# Patient Record
Sex: FEMALE | Race: BLACK OR AFRICAN AMERICAN | NOT HISPANIC OR LATINO | Employment: UNEMPLOYED | URBAN - METROPOLITAN AREA
[De-identification: names, ages, dates, MRNs, and addresses within clinical notes are randomized per-mention and may not be internally consistent; named-entity substitution may affect disease eponyms.]

---

## 2018-08-12 ENCOUNTER — HOSPITAL ENCOUNTER (EMERGENCY)
Facility: HOSPITAL | Age: 28
Discharge: PSYCHIATRIC OR SUBSTANCE USE FACILITY | End: 2018-08-12
Attending: EMERGENCY MEDICINE

## 2018-08-12 VITALS
DIASTOLIC BLOOD PRESSURE: 55 MMHG | HEART RATE: 76 BPM | TEMPERATURE: 98 F | RESPIRATION RATE: 12 BRPM | OXYGEN SATURATION: 100 % | SYSTOLIC BLOOD PRESSURE: 112 MMHG

## 2018-08-12 DIAGNOSIS — Z91.199 MEDICALLY NONCOMPLIANT: ICD-10-CM

## 2018-08-12 DIAGNOSIS — F33.2 SEVERE EPISODE OF RECURRENT MAJOR DEPRESSIVE DISORDER, WITHOUT PSYCHOTIC FEATURES: Primary | ICD-10-CM

## 2018-08-12 DIAGNOSIS — R46.2 BIZARRE BEHAVIOR: ICD-10-CM

## 2018-08-12 LAB
ALBUMIN SERPL BCP-MCNC: 4 G/DL
ALP SERPL-CCNC: 70 U/L
ALT SERPL W/O P-5'-P-CCNC: 11 U/L
AMPHET+METHAMPHET UR QL: NEGATIVE
ANION GAP SERPL CALC-SCNC: 11 MMOL/L
APAP SERPL-MCNC: <3 UG/ML
AST SERPL-CCNC: 13 U/L
B-HCG UR QL: NEGATIVE
BACTERIA #/AREA URNS AUTO: NORMAL /HPF
BARBITURATES UR QL SCN>200 NG/ML: NEGATIVE
BASOPHILS # BLD AUTO: 0.02 K/UL
BASOPHILS NFR BLD: 0.2 %
BENZODIAZ UR QL SCN>200 NG/ML: NEGATIVE
BILIRUB SERPL-MCNC: 0.4 MG/DL
BILIRUB UR QL STRIP: ABNORMAL
BUN SERPL-MCNC: 10 MG/DL
BZE UR QL SCN: NEGATIVE
CALCIUM SERPL-MCNC: 9.5 MG/DL
CANNABINOIDS UR QL SCN: NEGATIVE
CHLORIDE SERPL-SCNC: 106 MMOL/L
CLARITY UR REFRACT.AUTO: ABNORMAL
CO2 SERPL-SCNC: 24 MMOL/L
COLOR UR AUTO: YELLOW
CREAT SERPL-MCNC: 0.8 MG/DL
CREAT UR-MCNC: 226.6 MG/DL
DIFFERENTIAL METHOD: ABNORMAL
EOSINOPHIL # BLD AUTO: 0 K/UL
EOSINOPHIL NFR BLD: 0.1 %
ERYTHROCYTE [DISTWIDTH] IN BLOOD BY AUTOMATED COUNT: 14.7 %
EST. GFR  (AFRICAN AMERICAN): >60 ML/MIN/1.73 M^2
EST. GFR  (NON AFRICAN AMERICAN): >60 ML/MIN/1.73 M^2
ETHANOL SERPL-MCNC: <10 MG/DL
GLUCOSE SERPL-MCNC: 86 MG/DL
GLUCOSE UR QL STRIP: NEGATIVE
HCT VFR BLD AUTO: 40.3 %
HGB BLD-MCNC: 13.8 G/DL
HGB UR QL STRIP: NEGATIVE
KETONES UR QL STRIP: ABNORMAL
LEUKOCYTE ESTERASE UR QL STRIP: NEGATIVE
LYMPHOCYTES # BLD AUTO: 2.1 K/UL
LYMPHOCYTES NFR BLD: 19.3 %
MCH RBC QN AUTO: 28.8 PG
MCHC RBC AUTO-ENTMCNC: 34.2 G/DL
MCV RBC AUTO: 84 FL
METHADONE UR QL SCN>300 NG/ML: NEGATIVE
MICROSCOPIC COMMENT: NORMAL
MONOCYTES # BLD AUTO: 0.6 K/UL
MONOCYTES NFR BLD: 5.4 %
NEUTROPHILS # BLD AUTO: 8.3 K/UL
NEUTROPHILS NFR BLD: 74.8 %
NITRITE UR QL STRIP: NEGATIVE
OPIATES UR QL SCN: NEGATIVE
PCP UR QL SCN>25 NG/ML: NEGATIVE
PH UR STRIP: 6 [PH] (ref 5–8)
PLATELET # BLD AUTO: 341 K/UL
PMV BLD AUTO: 10.6 FL
POTASSIUM SERPL-SCNC: 3.8 MMOL/L
PROT SERPL-MCNC: 7.8 G/DL
PROT UR QL STRIP: NEGATIVE
RBC # BLD AUTO: 4.79 M/UL
RBC #/AREA URNS AUTO: 2 /HPF (ref 0–4)
SALICYLATES SERPL-MCNC: <5 MG/DL
SODIUM SERPL-SCNC: 141 MMOL/L
SP GR UR STRIP: 1.02 (ref 1–1.03)
TOXICOLOGY INFORMATION: NORMAL
TSH SERPL DL<=0.005 MIU/L-ACNC: 0.66 UIU/ML
URN SPEC COLLECT METH UR: ABNORMAL
UROBILINOGEN UR STRIP-ACNC: <2 EU/DL
WBC # BLD AUTO: 11.06 K/UL

## 2018-08-12 PROCEDURE — 80329 ANALGESICS NON-OPIOID 1 OR 2: CPT

## 2018-08-12 PROCEDURE — 80307 DRUG TEST PRSMV CHEM ANLYZR: CPT

## 2018-08-12 PROCEDURE — 63600175 PHARM REV CODE 636 W HCPCS: Performed by: EMERGENCY MEDICINE

## 2018-08-12 PROCEDURE — 96372 THER/PROPH/DIAG INJ SC/IM: CPT

## 2018-08-12 PROCEDURE — 81000 URINALYSIS NONAUTO W/SCOPE: CPT | Mod: 59

## 2018-08-12 PROCEDURE — 85025 COMPLETE CBC W/AUTO DIFF WBC: CPT

## 2018-08-12 PROCEDURE — 80053 COMPREHEN METABOLIC PANEL: CPT

## 2018-08-12 PROCEDURE — 84443 ASSAY THYROID STIM HORMONE: CPT

## 2018-08-12 PROCEDURE — 81025 URINE PREGNANCY TEST: CPT

## 2018-08-12 PROCEDURE — 80320 DRUG SCREEN QUANTALCOHOLS: CPT

## 2018-08-12 PROCEDURE — 99285 EMERGENCY DEPT VISIT HI MDM: CPT | Mod: 25

## 2018-08-12 RX ORDER — LORAZEPAM 1 MG/1
1 TABLET ORAL
Status: DISCONTINUED | OUTPATIENT
Start: 2018-08-12 | End: 2018-08-12

## 2018-08-12 RX ORDER — HALOPERIDOL 5 MG/1
10 TABLET ORAL
Status: DISCONTINUED | OUTPATIENT
Start: 2018-08-12 | End: 2018-08-12

## 2018-08-12 RX ORDER — ZIPRASIDONE MESYLATE 20 MG/ML
10 INJECTION, POWDER, LYOPHILIZED, FOR SOLUTION INTRAMUSCULAR
Status: COMPLETED | OUTPATIENT
Start: 2018-08-12 | End: 2018-08-12

## 2018-08-12 RX ORDER — ALPRAZOLAM 0.5 MG/1
0.5 TABLET ORAL 2 TIMES DAILY PRN
COMMUNITY

## 2018-08-12 RX ORDER — RISPERIDONE 2 MG/1
2 TABLET ORAL 2 TIMES DAILY
COMMUNITY
End: 2022-06-26 | Stop reason: SDUPTHER

## 2018-08-12 RX ORDER — HALOPERIDOL 10 MG/1
10 TABLET ORAL NIGHTLY
COMMUNITY
End: 2022-06-26 | Stop reason: SDUPTHER

## 2018-08-12 RX ADMIN — ZIPRASIDONE MESYLATE 10 MG: 20 INJECTION, POWDER, LYOPHILIZED, FOR SOLUTION INTRAMUSCULAR at 10:08

## 2018-08-12 NOTE — ED PROVIDER NOTES
Encounter Date: 2018       History     Chief Complaint   Patient presents with    Psychiatric Evaluation     The history is provided by the patient.   Mental Health Problem   The primary symptoms include delusions and bizarre behavior. The current episode started today. This is a chronic problem.   The degree of incapacity that she is experiencing as a consequence of her illness is moderate. Sequelae of the illness include harmed interpersonal relations. She does not admit to suicidal ideas. She does not contemplate harming herself. Risk factors that are present for mental illness include a history of mental illness.     Review of patient's allergies indicates:  No Known Allergies  Past Medical History:   Diagnosis Date    Anxiety     Chronic mental illness     Depression     Pulmonary embolus      Past Surgical History:   Procedure Laterality Date     SECTION       Family History   Problem Relation Age of Onset    Hyperlipidemia Mother     Hyperlipidemia Father     Kidney disease Father      Social History     Tobacco Use    Smoking status: Current Every Day Smoker     Packs/day: 0.50     Types: Cigarettes   Substance Use Topics    Alcohol use: No    Drug use: No     Review of Systems   Constitutional: Negative for fever.   HENT: Negative for sore throat.    Respiratory: Negative for shortness of breath.    Cardiovascular: Negative for chest pain.   Gastrointestinal: Negative for nausea.   Genitourinary: Negative for dysuria.   Musculoskeletal: Negative for back pain.   Skin: Negative for rash.   Neurological: Negative for weakness.   Hematological: Does not bruise/bleed easily.       Physical Exam     Initial Vitals [18 1715]   BP Pulse Resp Temp SpO2   136/83 110 20 98.7 °F (37.1 °C) 98 %      MAP       --         Vitals:    18 1715 18 1851   BP: 136/83 116/79   Pulse: 110 98   Resp: 20 18   Temp: 98.7 °F (37.1 °C) 99.1 °F (37.3 °C)   TempSrc: Oral Oral   SpO2: 98% 99%          Physical Exam    Nursing note and vitals reviewed.  Constitutional: She appears well-developed and well-nourished. No distress.   HENT:   Head: Normocephalic and atraumatic.   Mouth/Throat: Oropharynx is clear and moist.   Eyes: Conjunctivae and EOM are normal. Pupils are equal, round, and reactive to light.   Neck: Normal range of motion. Neck supple.   Cardiovascular: Normal rate, regular rhythm and normal heart sounds. Exam reveals no gallop and no friction rub.    No murmur heard.  Pulmonary/Chest: Breath sounds normal. No respiratory distress. She has no wheezes. She has no rhonchi. She has no rales.   Abdominal: Soft. Bowel sounds are normal. She exhibits no distension and no mass. There is no tenderness. There is no rebound and no guarding.   Musculoskeletal: Normal range of motion. She exhibits no edema or tenderness.   Neurological: She is alert and oriented to person, place, and time. She has normal strength.   Skin: Skin is warm and dry. No rash noted.   Psychiatric: She has a normal mood and affect. Her speech is delayed. She is slowed and withdrawn. Thought content is paranoid and delusional. She expresses no homicidal and no suicidal ideation.         ED Course   Procedures  Labs Reviewed   CBC W/ AUTO DIFFERENTIAL - Abnormal; Notable for the following components:       Result Value    RDW 14.7 (*)     Gran # (ANC) 8.3 (*)     Gran% 74.8 (*)     All other components within normal limits   URINALYSIS, REFLEX TO URINE CULTURE - Abnormal; Notable for the following components:    Appearance, UA Cloudy (*)     Ketones, UA 1+ (*)     Bilirubin (UA) 1+ (*)     All other components within normal limits    Narrative:     Preferred Collection Type->Urine, Clean Catch   SALICYLATE LEVEL - Abnormal; Notable for the following components:    Salicylate Lvl <5.0 (*)     All other components within normal limits   ACETAMINOPHEN LEVEL - Abnormal; Notable for the following components:    Acetaminophen (Tylenol),  Serum <3.0 (*)     All other components within normal limits   COMPREHENSIVE METABOLIC PANEL   PREGNANCY TEST, URINE RAPID   TSH   DRUG SCREEN PANEL, URINE EMERGENCY   ALCOHOL,MEDICAL (ETHANOL)   URINALYSIS MICROSCOPIC    Narrative:     Preferred Collection Type->Urine, Clean Catch          Imaging Results    None          Medical Decision Making:   ED Management:  The patient was received from the off-going emergency room physician Dr Moy at 6:00PM.  All pertinent details presently available from the patient encounter were discussed along with the expected plan for disposition.      All historical, clinical, and laboratory findings were reviewed with the patient/family in detail along with the indications for transfer (gravely disabled) to an inpatient psychiatric services as we do not have those services available at this facility.  All remaining questions and concerns were addressed at that time and the patient/family communicates understanding and agrees to proceed accordingly.      At this point the patient has been medically stabilized for transfer to inpatient psychiatry.     All pertinent details of the encounter were discussed with Dr Zapata at [facility] who agrees to accept the patient in transfer based on the needs/patient preferences outlined above.  Patient will be transferred by [facility] secondary to a need for secure/protective transport given the nature of the patients illness.                          Clinical Impression:       ICD-10-CM ICD-9-CM   1. Severe episode of recurrent major depressive disorder, without psychotic features F33.2 296.33   2. Bizarre behavior R46.2 312.9   3. Medically noncompliant Z91.19 V15.81                                  Eliud Souza MD  08/12/18 1901       Eliud Souza MD  08/12/18 2045

## 2018-09-06 ENCOUNTER — HOSPITAL ENCOUNTER (EMERGENCY)
Facility: HOSPITAL | Age: 28
Discharge: PSYCHIATRIC OR SUBSTANCE USE FACILITY | End: 2018-09-07
Attending: EMERGENCY MEDICINE

## 2018-09-06 VITALS
WEIGHT: 183.63 LBS | TEMPERATURE: 98 F | OXYGEN SATURATION: 100 % | BODY MASS INDEX: 30.59 KG/M2 | RESPIRATION RATE: 18 BRPM | HEART RATE: 101 BPM | DIASTOLIC BLOOD PRESSURE: 88 MMHG | SYSTOLIC BLOOD PRESSURE: 142 MMHG | HEIGHT: 65 IN

## 2018-09-06 DIAGNOSIS — F23 ACUTE PSYCHOSIS: ICD-10-CM

## 2018-09-06 DIAGNOSIS — E87.6 HYPOKALEMIA: ICD-10-CM

## 2018-09-06 DIAGNOSIS — Z72.0 TOBACCO ABUSE: ICD-10-CM

## 2018-09-06 DIAGNOSIS — F20.0 SCHIZOPHRENIA, PARANOID: Primary | ICD-10-CM

## 2018-09-06 LAB
ALBUMIN SERPL BCP-MCNC: 4 G/DL
ALP SERPL-CCNC: 66 U/L
ALT SERPL W/O P-5'-P-CCNC: 10 U/L
AMPHET+METHAMPHET UR QL: NEGATIVE
ANION GAP SERPL CALC-SCNC: 15 MMOL/L
APAP SERPL-MCNC: <3 UG/ML
AST SERPL-CCNC: 15 U/L
B-HCG UR QL: NEGATIVE
BACTERIA #/AREA URNS AUTO: ABNORMAL /HPF
BARBITURATES UR QL SCN>200 NG/ML: NEGATIVE
BASOPHILS # BLD AUTO: 0.01 K/UL
BASOPHILS NFR BLD: 0.1 %
BENZODIAZ UR QL SCN>200 NG/ML: NEGATIVE
BILIRUB SERPL-MCNC: 0.7 MG/DL
BILIRUB UR QL STRIP: ABNORMAL
BUN SERPL-MCNC: 7 MG/DL
BZE UR QL SCN: NEGATIVE
CALCIUM SERPL-MCNC: 9.6 MG/DL
CANNABINOIDS UR QL SCN: NEGATIVE
CHLORIDE SERPL-SCNC: 102 MMOL/L
CLARITY UR REFRACT.AUTO: ABNORMAL
CO2 SERPL-SCNC: 22 MMOL/L
COLOR UR AUTO: YELLOW
CREAT SERPL-MCNC: 0.9 MG/DL
CREAT UR-MCNC: 285 MG/DL
DIFFERENTIAL METHOD: ABNORMAL
EOSINOPHIL # BLD AUTO: 0 K/UL
EOSINOPHIL NFR BLD: 0.3 %
ERYTHROCYTE [DISTWIDTH] IN BLOOD BY AUTOMATED COUNT: 15.1 %
EST. GFR  (AFRICAN AMERICAN): >60 ML/MIN/1.73 M^2
EST. GFR  (NON AFRICAN AMERICAN): >60 ML/MIN/1.73 M^2
ETHANOL SERPL-MCNC: <10 MG/DL
GLUCOSE SERPL-MCNC: 92 MG/DL
GLUCOSE UR QL STRIP: NEGATIVE
HCT VFR BLD AUTO: 40.2 %
HGB BLD-MCNC: 13.6 G/DL
HGB UR QL STRIP: NEGATIVE
KETONES UR QL STRIP: ABNORMAL
LEUKOCYTE ESTERASE UR QL STRIP: NEGATIVE
LYMPHOCYTES # BLD AUTO: 3 K/UL
LYMPHOCYTES NFR BLD: 30.5 %
MCH RBC QN AUTO: 28.7 PG
MCHC RBC AUTO-ENTMCNC: 33.8 G/DL
MCV RBC AUTO: 85 FL
METHADONE UR QL SCN>300 NG/ML: NEGATIVE
MICROSCOPIC COMMENT: ABNORMAL
MONOCYTES # BLD AUTO: 0.7 K/UL
MONOCYTES NFR BLD: 6.9 %
NEUTROPHILS # BLD AUTO: 6.2 K/UL
NEUTROPHILS NFR BLD: 62 %
NITRITE UR QL STRIP: NEGATIVE
OPIATES UR QL SCN: NEGATIVE
PCP UR QL SCN>25 NG/ML: NEGATIVE
PH UR STRIP: 6 [PH] (ref 5–8)
PLATELET # BLD AUTO: 342 K/UL
PMV BLD AUTO: 10.1 FL
POTASSIUM SERPL-SCNC: 3.1 MMOL/L
PROT SERPL-MCNC: 7.7 G/DL
PROT UR QL STRIP: ABNORMAL
RBC # BLD AUTO: 4.74 M/UL
SALICYLATES SERPL-MCNC: <5 MG/DL
SODIUM SERPL-SCNC: 139 MMOL/L
SP GR UR STRIP: 1.02 (ref 1–1.03)
SQUAMOUS #/AREA URNS AUTO: 9 /HPF
TOXICOLOGY INFORMATION: NORMAL
TSH SERPL DL<=0.005 MIU/L-ACNC: 0.93 UIU/ML
URN SPEC COLLECT METH UR: ABNORMAL
UROBILINOGEN UR STRIP-ACNC: <2 EU/DL
WBC # BLD AUTO: 9.98 K/UL
WBC #/AREA URNS AUTO: 4 /HPF (ref 0–5)

## 2018-09-06 PROCEDURE — 84443 ASSAY THYROID STIM HORMONE: CPT

## 2018-09-06 PROCEDURE — 80307 DRUG TEST PRSMV CHEM ANLYZR: CPT

## 2018-09-06 PROCEDURE — 93005 ELECTROCARDIOGRAM TRACING: CPT

## 2018-09-06 PROCEDURE — 99285 EMERGENCY DEPT VISIT HI MDM: CPT | Mod: 25

## 2018-09-06 PROCEDURE — 85025 COMPLETE CBC W/AUTO DIFF WBC: CPT

## 2018-09-06 PROCEDURE — 81000 URINALYSIS NONAUTO W/SCOPE: CPT | Mod: 59

## 2018-09-06 PROCEDURE — 25000003 PHARM REV CODE 250: Performed by: EMERGENCY MEDICINE

## 2018-09-06 PROCEDURE — 93010 ELECTROCARDIOGRAM REPORT: CPT | Mod: ,,, | Performed by: INTERNAL MEDICINE

## 2018-09-06 PROCEDURE — 99900035 HC TECH TIME PER 15 MIN (STAT)

## 2018-09-06 PROCEDURE — 80053 COMPREHEN METABOLIC PANEL: CPT

## 2018-09-06 PROCEDURE — 80329 ANALGESICS NON-OPIOID 1 OR 2: CPT

## 2018-09-06 PROCEDURE — 80320 DRUG SCREEN QUANTALCOHOLS: CPT

## 2018-09-06 PROCEDURE — 81025 URINE PREGNANCY TEST: CPT

## 2018-09-06 RX ORDER — POTASSIUM CHLORIDE 20 MEQ/1
40 TABLET, EXTENDED RELEASE ORAL
Status: COMPLETED | OUTPATIENT
Start: 2018-09-06 | End: 2018-09-06

## 2018-09-06 RX ADMIN — POTASSIUM CHLORIDE 40 MEQ: 1500 TABLET, EXTENDED RELEASE ORAL at 08:09

## 2018-09-07 NOTE — ED NOTES
Patient accepted at [facility] by Dr. Valles arranged by [facility]. Report to be called to [phone] 5th floor [phone].

## 2018-09-07 NOTE — ED NOTES
Admit packet faxed to the following facilities: [facility], [facility], [facility], [facility], [facility], [facility], [facility], [facility],  [facility],  [facility], [facility], [facility], [facility], [facility], [facility], [facility], [facility], [facility], [facility], [facility], [facility], [facility], [facility], [place] and [facility].

## 2018-09-07 NOTE — ED NOTES
Attempted to have patient sign Patients Rights form and give information of primary contact. Family at bedside.

## 2018-09-07 NOTE — ED NOTES
Admit packet faxed to the following facilities: [facility], [facility], [facility], and [facility].

## 2018-09-07 NOTE — ED PROVIDER NOTES
Encounter Date: 2018       History     Chief Complaint   Patient presents with    Psychiatric Evaluation     [facility] called by family. Informed dispatch patient was having bizzare behavior and has not been taking medications lately.      Patient currently presents via EMS with concerns regarding erratic behavior.   The patient has had previous encounters for acute psychosis though the family is unaware of a specific diagnosis.  They note that she has not taken her medications since being discharged from [facility] after an inpatient stay earlier this year.    EMS was called secondary to what they describe as bizarre behavior including throwing bricks at random buildings and offering unusual conversation with questionable verbal hallucinations.  The patient unfortunately is not communicative at this point and is not offering additional history.  The  who is at bedside appears to be heavily intoxicated himself and is not able to offer much history aside from that stated above.        Review of patient's allergies indicates:  No Known Allergies  Past Medical History:   Diagnosis Date    Anxiety     Chronic mental illness     Depression     Pulmonary embolus      Past Surgical History:   Procedure Laterality Date     SECTION       Family History   Problem Relation Age of Onset    Hyperlipidemia Mother     Hyperlipidemia Father     Kidney disease Father      Social History     Tobacco Use    Smoking status: Current Every Day Smoker     Packs/day: 0.50     Types: Cigarettes   Substance Use Topics    Alcohol use: No    Drug use: No     Review of Systems   Unable to perform ROS: Psychiatric disorder       Physical Exam     Initial Vitals [18 1835]   BP Pulse Resp Temp SpO2   129/77 (!) 121 18 98.1 °F (36.7 °C) 100 %      MAP       --         Vitals:    18 1835   BP: 129/77   Pulse: (!) 121   Resp: 18   Temp: 98.1 °F (36.7 °C)   TempSrc: Oral   SpO2: 100%   Weight: 83.3 kg  "(183 lb 10.3 oz)   Height: 5' 5" (1.651 m)         Physical Exam    Nursing note and vitals reviewed.  Constitutional: She appears well-developed and well-nourished. She is not diaphoretic. No distress.   HENT:   Head: Normocephalic and atraumatic.   Right Ear: External ear normal.   Left Ear: External ear normal.   Nose: Nose normal.   Mouth/Throat: Oropharynx is clear and moist.   Eyes: Conjunctivae and EOM are normal. Pupils are equal, round, and reactive to light. No scleral icterus.   Neck: Neck supple. No tracheal deviation present. No JVD present.   Cardiovascular: Normal rate, regular rhythm, normal heart sounds and intact distal pulses. Exam reveals no gallop and no friction rub.    No murmur heard.  Pulmonary/Chest: Breath sounds normal. No respiratory distress. She has no wheezes. She has no rhonchi. She has no rales.   Abdominal: Soft. Bowel sounds are normal. She exhibits no distension. There is no tenderness.   Musculoskeletal: Normal range of motion. She exhibits no edema.   Neurological: She is alert. She has normal strength. No cranial nerve deficit or sensory deficit.   Skin: Skin is warm and dry. No rash noted.   Psychiatric: She is withdrawn and actively hallucinating (questionable auditory). She expresses inappropriate judgment. She is noncommunicative.   Exam is greatly limited by patient's unwillingness to communicate. She is inattentive.         ED Course   Procedures  Labs Reviewed   ACETAMINOPHEN LEVEL - Abnormal; Notable for the following components:       Result Value    Acetaminophen (Tylenol), Serum <3.0 (*)     All other components within normal limits   CBC W/ AUTO DIFFERENTIAL - Abnormal; Notable for the following components:    RDW 15.1 (*)     All other components within normal limits   COMPREHENSIVE METABOLIC PANEL - Abnormal; Notable for the following components:    Potassium 3.1 (*)     CO2 22 (*)     All other components within normal limits   SALICYLATE LEVEL - Abnormal; " Notable for the following components:    Salicylate Lvl <5.0 (*)     All other components within normal limits   URINALYSIS - Abnormal; Notable for the following components:    Appearance, UA Cloudy (*)     Protein, UA Trace (*)     Ketones, UA 1+ (*)     Bilirubin (UA) 1+ (*)     All other components within normal limits   URINALYSIS MICROSCOPIC - Abnormal; Notable for the following components:    Bacteria, UA Moderate (*)     All other components within normal limits   DRUG SCREEN PANEL, URINE EMERGENCY   ALCOHOL,MEDICAL (ETHANOL)   PREGNANCY TEST, URINE RAPID   TSH     EKG Readings: (Independently Interpreted)   Initial Reading: No STEMI. Rhythm: Sinus Tachycardia. Heart Rate: 102. Ectopy: No Ectopy. Conduction: Normal. Axis: Normal.       Imaging Results    None          Medical Decision Making:   ED Management:  All historical, clinical, and laboratory findings were reviewed with the patient/family in detail along with the indications for transfer to an inpatient psychiatric services as we do not have those services available at this facility.  All remaining questions and concerns were addressed at that time and the patient/family communicates understanding and agrees to proceed accordingly.    Patient has been medically stabilized began our search for inpatient psychiatric placement.  Eliud Souza MD  8:24 PM    All pertinent details of the encounter were discussed with Dr Valles at [facility] who agrees to accept the patient in transfer based on the needs/patient preferences outlined above.  Patient will be transferred by [facility] secondary to a need for secure/protective transport given the nature of the patients illness.  Eliud Souza MD  10:57 PM                        Clinical Impression:   The primary encounter diagnosis was Schizophrenia, paranoid. Diagnoses of Acute psychosis, Hypokalemia, and Tobacco abuse were also pertinent to this visit.                                 Eliud Souza  MD  09/06/18 2054

## 2018-09-07 NOTE — ED NOTES
Patient here for psych evaluation. +bizzare behavior. Pt making sexual advances to RN. Patient preforming strip tease while undressing. Pt also proceeds to play with her nipples while procrastinating putting on Carmona gown. Pt dressed out in Grey gown, and yellow socks. Pt belongings taken and placed in locker 1, code 1111. JAS Tello, at bedside to sit with patient.      Patient belongings  1- white shirt  1- Pink pair of shorts  1- pink bra  1- pair of black socks  1- pair of black shoes  1- set of white ear phones.

## 2018-09-07 NOTE — ED NOTES
Attempted to call report x2. Received voicemail. Unable to leave message. Will continue to try to call report.

## 2018-09-07 NOTE — ED NOTES
Initial trip ticket request made with [facility] at this time. Trip ticket # [id number].

## 2018-09-07 NOTE — ED NOTES
Patient family members spoke with Dr. Souza. Information given to Dr. Souza about patients previous hospital stay.

## 2018-09-07 NOTE — ED NOTES
Pt's mother brought bag w/ pt's medications which include    - 2 mg risperdal   - 0.5 mg xanax  - 10 mg haldol    Medications placed in EMS locker w/ pt's other belongings, verified w/ primary RN, Felisa.

## 2018-12-19 ENCOUNTER — HOSPITAL ENCOUNTER (INPATIENT)
Facility: HOSPITAL | Age: 28
LOS: 2 days | Discharge: LEFT BEFORE DISCHARGE | DRG: 175 | End: 2018-12-22
Attending: EMERGENCY MEDICINE | Admitting: HOSPITALIST

## 2018-12-19 DIAGNOSIS — Z72.0 TOBACCO ABUSE: ICD-10-CM

## 2018-12-19 DIAGNOSIS — I26.99 PULMONARY INFARCTION: ICD-10-CM

## 2018-12-19 DIAGNOSIS — I26.92 ACUTE SADDLE PULMONARY EMBOLISM WITHOUT ACUTE COR PULMONALE: Primary | ICD-10-CM

## 2018-12-19 DIAGNOSIS — I26.99 PE (PULMONARY THROMBOEMBOLISM): ICD-10-CM

## 2018-12-19 DIAGNOSIS — R04.2 HEMOPTYSIS: ICD-10-CM

## 2018-12-19 DIAGNOSIS — I26.99 PULMONARY EMBOLISM: ICD-10-CM

## 2018-12-19 PROCEDURE — 80307 DRUG TEST PRSMV CHEM ANLYZR: CPT

## 2018-12-19 PROCEDURE — 85730 THROMBOPLASTIN TIME PARTIAL: CPT

## 2018-12-19 PROCEDURE — 85610 PROTHROMBIN TIME: CPT

## 2018-12-19 PROCEDURE — 93010 ELECTROCARDIOGRAM REPORT: CPT | Mod: ,,, | Performed by: INTERNAL MEDICINE

## 2018-12-19 PROCEDURE — 99900035 HC TECH TIME PER 15 MIN (STAT)

## 2018-12-19 PROCEDURE — 83880 ASSAY OF NATRIURETIC PEPTIDE: CPT

## 2018-12-19 PROCEDURE — 93005 ELECTROCARDIOGRAM TRACING: CPT

## 2018-12-19 PROCEDURE — 81025 URINE PREGNANCY TEST: CPT

## 2018-12-19 PROCEDURE — 85025 COMPLETE CBC W/AUTO DIFF WBC: CPT

## 2018-12-19 PROCEDURE — 84484 ASSAY OF TROPONIN QUANT: CPT

## 2018-12-19 PROCEDURE — 80053 COMPREHEN METABOLIC PANEL: CPT

## 2018-12-19 PROCEDURE — 99291 CRITICAL CARE FIRST HOUR: CPT | Mod: 25

## 2018-12-19 PROCEDURE — 81003 URINALYSIS AUTO W/O SCOPE: CPT

## 2018-12-20 PROBLEM — I26.92 ACUTE SADDLE PULMONARY EMBOLISM WITHOUT ACUTE COR PULMONALE: Status: ACTIVE | Noted: 2018-12-20

## 2018-12-20 PROBLEM — D72.829 LEUKOCYTOSIS: Status: ACTIVE | Noted: 2018-12-20

## 2018-12-20 PROBLEM — I26.99 BILATERAL PULMONARY EMBOLISM: Status: ACTIVE | Noted: 2018-12-20

## 2018-12-20 PROBLEM — E43 SEVERE MALNUTRITION: Status: ACTIVE | Noted: 2018-12-20

## 2018-12-20 PROBLEM — R07.81 PLEURITIC PAIN: Status: ACTIVE | Noted: 2018-12-20

## 2018-12-20 LAB
ABO + RH BLD: NORMAL
ALBUMIN SERPL BCP-MCNC: 2.9 G/DL
ALBUMIN SERPL BCP-MCNC: 3.8 G/DL
ALP SERPL-CCNC: 69 U/L
ALT SERPL W/O P-5'-P-CCNC: 16 U/L
AMPHET+METHAMPHET UR QL: NEGATIVE
ANION GAP SERPL CALC-SCNC: 12 MMOL/L
ANION GAP SERPL CALC-SCNC: 14 MMOL/L
ANION GAP SERPL CALC-SCNC: 9 MMOL/L
APTT BLDCRRT: 31.5 SEC
APTT BLDCRRT: 47.5 SEC
APTT BLDCRRT: 60.4 SEC
AST SERPL-CCNC: 18 U/L
B-HCG UR QL: NEGATIVE
BARBITURATES UR QL SCN>200 NG/ML: NEGATIVE
BASOPHILS # BLD AUTO: 0.01 K/UL
BASOPHILS # BLD AUTO: 0.02 K/UL
BASOPHILS NFR BLD: 0.1 %
BENZODIAZ UR QL SCN>200 NG/ML: NEGATIVE
BILIRUB SERPL-MCNC: 2 MG/DL
BILIRUB UR QL STRIP: ABNORMAL
BLD GP AB SCN CELLS X3 SERPL QL: NORMAL
BNP SERPL-MCNC: 11 PG/ML
BUN SERPL-MCNC: 12 MG/DL
BUN SERPL-MCNC: 9 MG/DL
BUN SERPL-MCNC: 9 MG/DL
BZE UR QL SCN: NEGATIVE
CALCIUM SERPL-MCNC: 8.4 MG/DL
CALCIUM SERPL-MCNC: 8.6 MG/DL
CALCIUM SERPL-MCNC: 9.9 MG/DL
CANNABINOIDS UR QL SCN: NORMAL
CHLORIDE SERPL-SCNC: 105 MMOL/L
CHLORIDE SERPL-SCNC: 106 MMOL/L
CHLORIDE SERPL-SCNC: 99 MMOL/L
CLARITY UR REFRACT.AUTO: CLEAR
CO2 SERPL-SCNC: 21 MMOL/L
CO2 SERPL-SCNC: 22 MMOL/L
CO2 SERPL-SCNC: 23 MMOL/L
COLOR UR AUTO: ABNORMAL
CREAT SERPL-MCNC: 0.7 MG/DL
CREAT SERPL-MCNC: 0.7 MG/DL
CREAT SERPL-MCNC: 1 MG/DL
CREAT UR-MCNC: 233 MG/DL
DACRYOCYTES BLD QL SMEAR: ABNORMAL
DIASTOLIC DYSFUNCTION: NO
DIFFERENTIAL METHOD: ABNORMAL
EOSINOPHIL # BLD AUTO: 0 K/UL
EOSINOPHIL NFR BLD: 0 %
ERYTHROCYTE [DISTWIDTH] IN BLOOD BY AUTOMATED COUNT: 12.9 %
ERYTHROCYTE [DISTWIDTH] IN BLOOD BY AUTOMATED COUNT: 13 %
EST. GFR  (AFRICAN AMERICAN): >60 ML/MIN/1.73 M^2
EST. GFR  (NON AFRICAN AMERICAN): >60 ML/MIN/1.73 M^2
GLUCOSE SERPL-MCNC: 100 MG/DL
GLUCOSE SERPL-MCNC: 104 MG/DL
GLUCOSE SERPL-MCNC: 124 MG/DL
GLUCOSE UR QL STRIP: NEGATIVE
HCT VFR BLD AUTO: 32.7 %
HCT VFR BLD AUTO: 32.9 %
HCT VFR BLD AUTO: 33 %
HCT VFR BLD AUTO: 40.2 %
HGB BLD-MCNC: 11.2 G/DL
HGB BLD-MCNC: 11.2 G/DL
HGB BLD-MCNC: 11.3 G/DL
HGB BLD-MCNC: 14 G/DL
HGB UR QL STRIP: ABNORMAL
HYPOCHROMIA BLD QL SMEAR: ABNORMAL
INR PPP: 1.2
KETONES UR QL STRIP: ABNORMAL
LACTATE SERPL-SCNC: 1.3 MMOL/L
LEUKOCYTE ESTERASE UR QL STRIP: NEGATIVE
LYMPHOCYTES # BLD AUTO: 2 K/UL
LYMPHOCYTES # BLD AUTO: 2.8 K/UL
LYMPHOCYTES # BLD AUTO: 3 K/UL
LYMPHOCYTES # BLD AUTO: 3.1 K/UL
LYMPHOCYTES NFR BLD: 10.9 %
LYMPHOCYTES NFR BLD: 12.3 %
LYMPHOCYTES NFR BLD: 16.2 %
LYMPHOCYTES NFR BLD: 16.8 %
MAGNESIUM SERPL-MCNC: 1.5 MG/DL
MCH RBC QN AUTO: 28.7 PG
MCH RBC QN AUTO: 29 PG
MCH RBC QN AUTO: 29 PG
MCH RBC QN AUTO: 29.2 PG
MCHC RBC AUTO-ENTMCNC: 33.9 G/DL
MCHC RBC AUTO-ENTMCNC: 34.3 G/DL
MCHC RBC AUTO-ENTMCNC: 34.3 G/DL
MCHC RBC AUTO-ENTMCNC: 34.8 G/DL
MCV RBC AUTO: 83 FL
MCV RBC AUTO: 84 FL
MCV RBC AUTO: 85 FL
MCV RBC AUTO: 86 FL
METHADONE UR QL SCN>300 NG/ML: NEGATIVE
MONOCYTES # BLD AUTO: 1.6 K/UL
MONOCYTES # BLD AUTO: 1.8 K/UL
MONOCYTES # BLD AUTO: 2.1 K/UL
MONOCYTES # BLD AUTO: 2.4 K/UL
MONOCYTES NFR BLD: 10.3 %
MONOCYTES NFR BLD: 10.5 %
MONOCYTES NFR BLD: 10.9 %
MONOCYTES NFR BLD: 8.9 %
NEUTROPHILS # BLD AUTO: 12.8 K/UL
NEUTROPHILS # BLD AUTO: 13.9 K/UL
NEUTROPHILS # BLD AUTO: 14.5 K/UL
NEUTROPHILS # BLD AUTO: 17.8 K/UL
NEUTROPHILS NFR BLD: 72.6 %
NEUTROPHILS NFR BLD: 73.2 %
NEUTROPHILS NFR BLD: 77.3 %
NEUTROPHILS NFR BLD: 80.1 %
NITRITE UR QL STRIP: NEGATIVE
OPIATES UR QL SCN: NEGATIVE
PCP UR QL SCN>25 NG/ML: NEGATIVE
PH UR STRIP: 6 [PH] (ref 5–8)
PHOSPHATE SERPL-MCNC: 1.8 MG/DL
PHOSPHATE SERPL-MCNC: 1.8 MG/DL
PLATELET # BLD AUTO: 173 K/UL
PLATELET # BLD AUTO: 174 K/UL
PLATELET # BLD AUTO: 187 K/UL
PLATELET # BLD AUTO: 236 K/UL
PLATELET BLD QL SMEAR: ABNORMAL
PMV BLD AUTO: 10.2 FL
PMV BLD AUTO: 10.5 FL
PMV BLD AUTO: 10.6 FL
PMV BLD AUTO: 10.7 FL
POIKILOCYTOSIS BLD QL SMEAR: SLIGHT
POLYCHROMASIA BLD QL SMEAR: ABNORMAL
POTASSIUM SERPL-SCNC: 3.5 MMOL/L
POTASSIUM SERPL-SCNC: 3.6 MMOL/L
POTASSIUM SERPL-SCNC: 3.8 MMOL/L
PROT SERPL-MCNC: 8.6 G/DL
PROT UR QL STRIP: ABNORMAL
PROTHROMBIN TIME: 12.5 SEC
RBC # BLD AUTO: 3.84 M/UL
RBC # BLD AUTO: 3.86 M/UL
RBC # BLD AUTO: 3.9 M/UL
RBC # BLD AUTO: 4.87 M/UL
RETIRED EF AND QEF - SEE NOTES: 60 (ref 55–65)
ROULEAUX BLD QL SMEAR: PRESENT
SODIUM SERPL-SCNC: 136 MMOL/L
SODIUM SERPL-SCNC: 137 MMOL/L
SODIUM SERPL-SCNC: 138 MMOL/L
SP GR UR STRIP: 1.02 (ref 1–1.03)
STOMATOCYTES BLD QL SMEAR: PRESENT
TARGETS BLD QL SMEAR: ABNORMAL
TOXICOLOGY INFORMATION: NORMAL
TROPONIN I SERPL DL<=0.01 NG/ML-MCNC: 0.01 NG/ML
URN SPEC COLLECT METH UR: ABNORMAL
UROBILINOGEN UR STRIP-ACNC: ABNORMAL EU/DL
WBC # BLD AUTO: 17.6 K/UL
WBC # BLD AUTO: 18.09 K/UL
WBC # BLD AUTO: 19.14 K/UL
WBC # BLD AUTO: 23.14 K/UL

## 2018-12-20 PROCEDURE — 99291 CRITICAL CARE FIRST HOUR: CPT | Mod: ,,, | Performed by: NURSE PRACTITIONER

## 2018-12-20 PROCEDURE — 25000003 PHARM REV CODE 250: Performed by: HOSPITALIST

## 2018-12-20 PROCEDURE — 93010 ELECTROCARDIOGRAM REPORT: CPT | Mod: ,,, | Performed by: INTERNAL MEDICINE

## 2018-12-20 PROCEDURE — 25000003 PHARM REV CODE 250: Performed by: EMERGENCY MEDICINE

## 2018-12-20 PROCEDURE — 25000003 PHARM REV CODE 250: Performed by: INTERNAL MEDICINE

## 2018-12-20 PROCEDURE — 80069 RENAL FUNCTION PANEL: CPT

## 2018-12-20 PROCEDURE — 97802 MEDICAL NUTRITION INDIV IN: CPT

## 2018-12-20 PROCEDURE — 25500020 PHARM REV CODE 255: Performed by: EMERGENCY MEDICINE

## 2018-12-20 PROCEDURE — 21400001 HC TELEMETRY ROOM

## 2018-12-20 PROCEDURE — 87040 BLOOD CULTURE FOR BACTERIA: CPT | Mod: 59

## 2018-12-20 PROCEDURE — 85025 COMPLETE CBC W/AUTO DIFF WBC: CPT | Mod: 91

## 2018-12-20 PROCEDURE — 36415 COLL VENOUS BLD VENIPUNCTURE: CPT

## 2018-12-20 PROCEDURE — 63600175 PHARM REV CODE 636 W HCPCS: Performed by: HOSPITALIST

## 2018-12-20 PROCEDURE — 83605 ASSAY OF LACTIC ACID: CPT

## 2018-12-20 PROCEDURE — 80048 BASIC METABOLIC PNL TOTAL CA: CPT

## 2018-12-20 PROCEDURE — 93306 TTE W/DOPPLER COMPLETE: CPT

## 2018-12-20 PROCEDURE — 96375 TX/PRO/DX INJ NEW DRUG ADDON: CPT

## 2018-12-20 PROCEDURE — 63600175 PHARM REV CODE 636 W HCPCS: Performed by: EMERGENCY MEDICINE

## 2018-12-20 PROCEDURE — 96365 THER/PROPH/DIAG IV INF INIT: CPT

## 2018-12-20 PROCEDURE — 85730 THROMBOPLASTIN TIME PARTIAL: CPT | Mod: 91

## 2018-12-20 PROCEDURE — 85730 THROMBOPLASTIN TIME PARTIAL: CPT

## 2018-12-20 PROCEDURE — 96367 TX/PROPH/DG ADDL SEQ IV INF: CPT

## 2018-12-20 PROCEDURE — 25000003 PHARM REV CODE 250: Performed by: NURSE PRACTITIONER

## 2018-12-20 PROCEDURE — 63600175 PHARM REV CODE 636 W HCPCS: Performed by: INTERNAL MEDICINE

## 2018-12-20 PROCEDURE — 93005 ELECTROCARDIOGRAM TRACING: CPT

## 2018-12-20 PROCEDURE — C9113 INJ PANTOPRAZOLE SODIUM, VIA: HCPCS | Performed by: HOSPITALIST

## 2018-12-20 PROCEDURE — 96366 THER/PROPH/DIAG IV INF ADDON: CPT

## 2018-12-20 PROCEDURE — 83735 ASSAY OF MAGNESIUM: CPT

## 2018-12-20 PROCEDURE — 93306 TTE W/DOPPLER COMPLETE: CPT | Mod: 26,,, | Performed by: INTERNAL MEDICINE

## 2018-12-20 PROCEDURE — 86850 RBC ANTIBODY SCREEN: CPT

## 2018-12-20 RX ORDER — HEPARIN SODIUM 10000 [USP'U]/100ML
18 INJECTION, SOLUTION INTRAVENOUS CONTINUOUS
Status: DISCONTINUED | OUTPATIENT
Start: 2018-12-20 | End: 2018-12-20 | Stop reason: SDUPTHER

## 2018-12-20 RX ORDER — SODIUM CHLORIDE, SODIUM LACTATE, POTASSIUM CHLORIDE, CALCIUM CHLORIDE 600; 310; 30; 20 MG/100ML; MG/100ML; MG/100ML; MG/100ML
INJECTION, SOLUTION INTRAVENOUS CONTINUOUS
Status: DISCONTINUED | OUTPATIENT
Start: 2018-12-20 | End: 2018-12-22 | Stop reason: HOSPADM

## 2018-12-20 RX ORDER — BENZONATATE 100 MG/1
100 CAPSULE ORAL 3 TIMES DAILY PRN
Status: DISCONTINUED | OUTPATIENT
Start: 2018-12-20 | End: 2018-12-22 | Stop reason: HOSPADM

## 2018-12-20 RX ORDER — ACETAMINOPHEN 325 MG/1
650 TABLET ORAL EVERY 6 HOURS PRN
Status: DISCONTINUED | OUTPATIENT
Start: 2018-12-20 | End: 2018-12-22 | Stop reason: HOSPADM

## 2018-12-20 RX ORDER — HEPARIN SODIUM,PORCINE/D5W 25000/250
18 INTRAVENOUS SOLUTION INTRAVENOUS CONTINUOUS
Status: DISCONTINUED | OUTPATIENT
Start: 2018-12-20 | End: 2018-12-22 | Stop reason: HOSPADM

## 2018-12-20 RX ORDER — HALOPERIDOL 1 MG/1
2 TABLET ORAL 3 TIMES DAILY
Status: DISCONTINUED | OUTPATIENT
Start: 2018-12-20 | End: 2018-12-22 | Stop reason: HOSPADM

## 2018-12-20 RX ORDER — POTASSIUM CHLORIDE 20 MEQ/15ML
20 SOLUTION ORAL ONCE
Status: COMPLETED | OUTPATIENT
Start: 2018-12-20 | End: 2018-12-20

## 2018-12-20 RX ORDER — KETOROLAC TROMETHAMINE 30 MG/ML
10 INJECTION, SOLUTION INTRAMUSCULAR; INTRAVENOUS
Status: COMPLETED | OUTPATIENT
Start: 2018-12-20 | End: 2018-12-20

## 2018-12-20 RX ORDER — SODIUM CHLORIDE 0.9 % (FLUSH) 0.9 %
3 SYRINGE (ML) INJECTION
Status: DISCONTINUED | OUTPATIENT
Start: 2018-12-20 | End: 2018-12-22 | Stop reason: HOSPADM

## 2018-12-20 RX ORDER — HEPARIN SODIUM,PORCINE/D5W 25000/250
12 INTRAVENOUS SOLUTION INTRAVENOUS CONTINUOUS
Status: DISCONTINUED | OUTPATIENT
Start: 2018-12-20 | End: 2018-12-20

## 2018-12-20 RX ORDER — IPRATROPIUM BROMIDE AND ALBUTEROL SULFATE 2.5; .5 MG/3ML; MG/3ML
3 SOLUTION RESPIRATORY (INHALATION) EVERY 6 HOURS PRN
Status: DISCONTINUED | OUTPATIENT
Start: 2018-12-20 | End: 2018-12-22 | Stop reason: HOSPADM

## 2018-12-20 RX ORDER — PANTOPRAZOLE SODIUM 40 MG/10ML
40 INJECTION, POWDER, LYOPHILIZED, FOR SOLUTION INTRAVENOUS DAILY
Status: DISCONTINUED | OUTPATIENT
Start: 2018-12-20 | End: 2018-12-22 | Stop reason: HOSPADM

## 2018-12-20 RX ORDER — KETOROLAC TROMETHAMINE 10 MG/1
10 TABLET, FILM COATED ORAL EVERY 6 HOURS PRN
Status: DISCONTINUED | OUTPATIENT
Start: 2018-12-20 | End: 2018-12-22 | Stop reason: HOSPADM

## 2018-12-20 RX ADMIN — HEPARIN SODIUM AND DEXTROSE 12 UNITS/KG/HR: 10000; 5 INJECTION INTRAVENOUS at 02:12

## 2018-12-20 RX ADMIN — IOHEXOL 100 ML: 350 INJECTION, SOLUTION INTRAVENOUS at 01:12

## 2018-12-20 RX ADMIN — HEPARIN SODIUM AND DEXTROSE 18 UNITS/KG/HR: 10000; 5 INJECTION INTRAVENOUS at 07:12

## 2018-12-20 RX ADMIN — HALOPERIDOL 2 MG: 1 TABLET ORAL at 02:12

## 2018-12-20 RX ADMIN — POTASSIUM CHLORIDE 20 MEQ: 20 SOLUTION ORAL at 05:12

## 2018-12-20 RX ADMIN — KETOROLAC TROMETHAMINE 10 MG: 10 TABLET, FILM COATED ORAL at 04:12

## 2018-12-20 RX ADMIN — SODIUM CHLORIDE, SODIUM LACTATE, POTASSIUM CHLORIDE, AND CALCIUM CHLORIDE: .6; .31; .03; .02 INJECTION, SOLUTION INTRAVENOUS at 05:12

## 2018-12-20 RX ADMIN — SODIUM CHLORIDE, SODIUM LACTATE, POTASSIUM CHLORIDE, AND CALCIUM CHLORIDE: .6; .31; .03; .02 INJECTION, SOLUTION INTRAVENOUS at 10:12

## 2018-12-20 RX ADMIN — KETOROLAC TROMETHAMINE 10 MG: 10 TABLET, FILM COATED ORAL at 08:12

## 2018-12-20 RX ADMIN — KETOROLAC TROMETHAMINE 10 MG: 30 INJECTION INTRAMUSCULAR; INTRAVENOUS at 12:12

## 2018-12-20 RX ADMIN — HALOPERIDOL 2 MG: 1 TABLET ORAL at 08:12

## 2018-12-20 RX ADMIN — ACETAMINOPHEN 650 MG: 325 TABLET ORAL at 07:12

## 2018-12-20 RX ADMIN — CEFTRIAXONE 1 G: 1 INJECTION, SOLUTION INTRAVENOUS at 12:12

## 2018-12-20 RX ADMIN — HEPARIN SODIUM AND DEXTROSE 18 UNITS/KG/HR: 10000; 5 INJECTION INTRAVENOUS at 04:12

## 2018-12-20 RX ADMIN — SODIUM CHLORIDE 2040 ML: 0.9 INJECTION, SOLUTION INTRAVENOUS at 12:12

## 2018-12-20 RX ADMIN — BENZONATATE 100 MG: 100 CAPSULE ORAL at 08:12

## 2018-12-20 RX ADMIN — PIPERACILLIN SODIUM AND TAZOBACTAM SODIUM 4.5 G: 4; .5 INJECTION, POWDER, LYOPHILIZED, FOR SOLUTION INTRAVENOUS at 04:12

## 2018-12-20 RX ADMIN — PANTOPRAZOLE SODIUM 40 MG: 40 INJECTION, POWDER, FOR SOLUTION INTRAVENOUS at 04:12

## 2018-12-20 RX ADMIN — PIPERACILLIN SODIUM AND TAZOBACTAM SODIUM 4.5 G: 4; .5 INJECTION, POWDER, LYOPHILIZED, FOR SOLUTION INTRAVENOUS at 08:12

## 2018-12-20 RX ADMIN — AZITHROMYCIN MONOHYDRATE 500 MG: 500 INJECTION, POWDER, LYOPHILIZED, FOR SOLUTION INTRAVENOUS at 12:12

## 2018-12-20 NOTE — HPI
28 year old female with PMH including paranoid schizophrenia, major depression, anxiety, current everyday tobacco smoker, and PE in 2015 after pregnancy  Presented to ED on 12/19 with complaint of rt chest wall pain x 3 d with cough x 1 d  Reported intermittent blood streaks noted in sputum while in ED    Evaluation revealed tachycardia, leukocytosis, and CTA showed saddle to RT main PA PE with extension peripherally with evidence of rt pulmonary infarcts, no evidence on CT of rt heart strain; no hypoxia while in ED

## 2018-12-20 NOTE — ED NOTES
Warm blankets given for comfort. MD reports no water at present. Hr decreasing into 120's at present. Patient calm and co operative at present. Spouse at bedside will continue to monitor.

## 2018-12-20 NOTE — ASSESSMENT & PLAN NOTE
Review of records reveals diagnoses of anxiety, depression, and paranoid schizophrenia; PEC with inpt psych 7/2018, 8/2018, and 9/2018  Pt will not discuss psych or medical health history  She does not currently acknowledge hallucinations  Denies taking any of noted home meds  Cooperative with care today but ovenight refused many aspects; may need tele psych assistance   Replace listed home dose haldol with po 2mg Q8h for now

## 2018-12-20 NOTE — ED NOTES
Patient brought to restroom via wheelchair patient c/o nausea and the need to vomit. Ice discarded. She also reports she has to have a BM.

## 2018-12-20 NOTE — ASSESSMENT & PLAN NOTE
Contributing Nutrition Diagnosis  Malnutrition in the context of Acute Illness/Injury    Related to (etiology):  Inadequate energy intake & decreased appetite     Signs and Symptoms (as evidenced by):  Energy Intake: <50% of estimated energy requirement for 5 days  Body Fat Depletion: mild depletion of orbitals and thoracic and lumbar region   Muscle Mass Depletion: mild depletion of temples and clavicle region   Weight Loss: 19 % within the last 3 months     Interventions/Recommendations (treatment strategy):  Seen above     Nutrition Diagnosis Status:  New

## 2018-12-20 NOTE — PROGRESS NOTES
Pt was seen and examined at bedside . 28 y/p aaf admitted with a dx of Saddle PE hemodynamically stable . Pt was admitted to ICU and started on heparin  Drip / CVT was consulted and recommend  Medical treatment for now . Pt now with 102 fF most likely due to PE , however will start prophylactic IVAB and f/u blood cx  Pt most likely will need  Direct thrombolytic  Treatment vs surgery . F/U ARMEN

## 2018-12-20 NOTE — NURSING
Report received from CRISTHIAN Blanchard. Patient transferred via EMS from [facility]. Plan of care reviewed. VSS. AAOx3. Room air. NSR on monitor. No complaints of pain at this time. Patient on bedrest. Heparin gtt per orders. Heparin changed from low to high intensity per orders. MD at bedside. Questions answers. Patient coughing up scant amounts of darj red bloody sputum. PIVs intact. Dressing clean dry and intact. Turn every two hours to prevent skin breakdown. Fall precautions in place. Will cont to monitor

## 2018-12-20 NOTE — PLAN OF CARE
Problem: Adult Inpatient Plan of Care  Goal: Plan of Care Review  Outcome: Ongoing (interventions implemented as appropriate)  Recommendations     Recommendation/Intervention:  1. When medically able, ADAT to Regular.   2. If unable to adv diet within 72 hrs to at least full liquids, consider nutrition support:  Option 1: Clinimix E 4.25/10 at 80 ml/hr with 20 % 250 ml lipids daily (1479 kcal, 82 g protein, 1.96 mg/kg/min dextrose infusion rate).   Option 2: Clinimix E 4.25/5 at 120 ml/hr with 20 % 250 ml lipids daily (1479 kcal, 122 g protein, 1.47 mg/kg/min dextrose infusion rate).    Goals: Meet > 85 % EEN/EPN while admitted  Nutrition Goal Status: new  Communication of RD Recs: (POC, sticky note)

## 2018-12-20 NOTE — CONSULTS
[facility] -   Critical Care Medicine  Consult Note    Patient Name: Nimo Mahan  MRN: [id number]  Admission Date: 2018  Hospital Length of Stay: 0 days  Code Status: Full Code  Attending Physician: Brendan Jiménez, *   Primary Care Provider: Primary Doctor No   Principal Problem: Acute saddle pulmonary embolism without acute cor pulmonale      Subjective:     HPI:  28 year old female with PMH including paranoid schizophrenia, major depression, anxiety, current everyday tobacco smoker, and PE in  after pregnancy  Presented to ED on  with complaint of rt chest wall pain x 3 d with cough x 1 d  Reported intermittent blood streaks noted in sputum while in ED    Evaluation revealed tachycardia, leukocytosis, and CTA showed saddle to RT main PA PE with extension peripherally with evidence of rt pulmonary infarcts, no evidence on CT of rt heart strain; no hypoxia while in ED    Hospital/ICU Course:  Admitted to ICU on heparin infusion for hemodynamic monitoring while awaiting vascular surgery consult; this morning she is awake and alert, reluctant to answer questions and appears agitated by my presence; she denies SOB and reports CP is currently controlled; she acknowledges having PE in past but will not answer how she was treated at that time, she denies family history of clots; she will not answer any questions regarding anxiety, depression, psych history; reports she has not been taking any home medications; t max overnight 102.7    Past Medical History:   Diagnosis Date    Anxiety     Chronic mental illness     Depression     Pulmonary embolus        Past Surgical History:   Procedure Laterality Date     SECTION         Review of patient's allergies indicates:   Allergen Reactions    Sulfamethoxazole-trimethoprim      Patient says she is Immune to it       Family History     Problem Relation (Age of Onset)    Hyperlipidemia Mother, Father    Kidney disease Father         Tobacco Use    Smoking status: Current Every Day Smoker     Packs/day: 0.50     Types: Cigarettes    Smokeless tobacco: Never Used   Substance and Sexual Activity    Alcohol use: No    Drug use: Yes     Types: Marijuana    Sexual activity: Yes     Partners: Male         Review of Systems   Reason unable to perform ROS: limited responses.   Respiratory: Positive for cough. Negative for shortness of breath.         Denies sputum production today   Cardiovascular: Negative for chest pain and leg swelling.     Objective:     Vital Signs (Most Recent):  Temp: 99.9 °F (37.7 °C) (12/20/18 1000)  Pulse: 102 (12/20/18 1000)  Resp: (!) 29 (12/20/18 1000)  BP: (!) 100/58 (12/20/18 1000)  SpO2: 97 % (12/20/18 1000) Vital Signs (24h Range):  Temp:  [98.1 °F (36.7 °C)-102.7 °F (39.3 °C)] 99.9 °F (37.7 °C)  Pulse:  [] 102  Resp:  [22-46] 29  SpO2:  [96 %-100 %] 97 %  BP: ()/(50-67) 100/58     Weight: 68 kg (149 lb 14.6 oz)  Body mass index is 25.73 kg/m².      Intake/Output Summary (Last 24 hours) at 12/20/2018 1332  Last data filed at 12/20/2018 1000  Gross per 24 hour   Intake 271.08 ml   Output 450 ml   Net -178.92 ml       Physical Exam   Constitutional: She is oriented to person, place, and time. No distress.   Thin female resting in bed with NAD   HENT:   Head: Normocephalic and atraumatic.   Eyes: Conjunctivae are normal. Pupils are equal, round, and reactive to light.   Neck: No tracheal deviation present.   Cardiovascular: Normal rate and regular rhythm.   Pulmonary/Chest: Effort normal and breath sounds normal. No respiratory distress.   Abdominal: Soft. Bowel sounds are normal. She exhibits no distension. There is no tenderness.   Neurological: She is alert and oriented to person, place, and time.   Skin: Skin is warm and dry. Capillary refill takes less than 2 seconds.   Psychiatric: Her speech is normal. Her affect is blunt. She is withdrawn.   Limited psych assessment, blunt affect evasive to  questions re past or present health       Vents:       Lines/Drains/Airways     Peripheral Intravenous Line                 Peripheral IV - Single Lumen 12/19/18 2336 Right Antecubital less than 1 day         Peripheral IV - Single Lumen 12/20/18 0153 Left Upper Arm less than 1 day                Significant Labs:    CBC/Anemia Profile:  Recent Labs   Lab 12/19/18  2336 12/20/18  0434 12/20/18  0809   WBC 23.14* 19.14*  17.60* 18.09*   HGB 14.0 11.2*  11.2* 11.3*   HCT 40.2 33.0*  32.7* 32.9*    173  174 187   MCV 83 86  85 84   RDW 12.9 13.0  13.0 13.0        Chemistries:  Recent Labs   Lab 12/19/18  2336 12/20/18  0809 12/20/18  1048    138 137   K 3.6 3.8 3.5   CL 99 105 106   CO2 23 21* 22*   BUN 12 9 9   CREATININE 1.0 0.7 0.7   CALCIUM 9.9 8.6* 8.4*   ALBUMIN 3.8 2.9*  --    PROT 8.6*  --   --    BILITOT 2.0*  --   --    ALKPHOS 69  --   --    ALT 16  --   --    AST 18  --   --    MG  --  1.5*  --    PHOS  --  1.8*  1.8*  --        All pertinent labs within the past 24 hours have been reviewed.    Significant Imaging:   I have reviewed all pertinent imaging results/findings within the past 24 hours.  I have reviewed and interpreted all pertinent imaging results/findings within the past 24 hours.      ABG  No results for input(s): PH, PO2, PCO2, HCO3, BE in the last 168 hours.  Assessment/Plan:     Psychiatric    Review of records reveals diagnoses of anxiety, depression, and paranoid schizophrenia; PEC with inpt psych 7/2018, 8/2018, and 9/2018  Pt will not discuss psych or medical health history  She does not currently acknowledge hallucinations  Denies taking any of noted home meds  Cooperative with care today but ovenight refused many aspects; may need tele psych assistance   Replace listed home dose haldol with po 2mg Q8h for now       Pulmonary   Pleuritic pain    Likely s/t PE with rt pulmonary infarctions  Short course prn oral toradol     Hematology   * Acute saddle pulmonary  "embolism without acute cor pulmonale    2nd diagnosis, unprovoked PE  Will not discuss prior treatment or any work up for cause but states "I don't have a clotting disorder"  Clotting in addition to recent wt loss does raise some suspicion for malignancy although possible that wt loss may be s/t paranoid/psych disorder  Continue heparin infusion for full anticoagulation  Vascular consult for ?intervention pending  Echo to r/o rt heart strain pending  Continue ICU hemodynamic monitoring     Oncology   Leukocytosis    With fever  Suspect reative s/t pulmonary infarcts but cannot rule out infectious process  Empiric abx for pulmonary coverage added by attending  Blood cultures sent  UA without leukocytes, nitrites         Critical Care Daily Checklist:    A: Awake: RASS Goal/Actual Goal:    Actual: Pride Agitation Sedation Scale (RASS): Alert and calm   B: Spontaneous Breathing Trial Performed?     C: SAT & SBT Coordinated?  n/a                      D: Delirium: CAM-ICU Overall CAM-ICU: Negative   E: Early Mobility Performed? ROM   F: Feeding Goal: Goals: Meet > 85 % EEN/EPN while admitted  Status: Nutrition Goal Status: new   Current Diet Order   Procedures    Diet NPO Except for: Medication, Ice Chips, Sips with Medication     Order Specific Question:   Except for     Answer:   Medication     Order Specific Question:   Except for     Answer:   Ice Chips     Order Specific Question:   Except for     Answer:   Sips with Medication      AS: Analgesia/Sedation Prn toradol   T: Thromboembolic Prophylaxis heparin   H: HOB > 300 Yes   U: Stress Ulcer Prophylaxis (if needed) PPI   G: Glucose Control monitor   B: Bowel Function     I: Indwelling Catheter (Lines & West) Necessity reviewed   D: De-escalation of Antimicrobials/Pharmacotherapies reviewed    Plan for the day/ETD As above    Code Status:  Family/Goals of Care: Full Code  Anticipate home on discharge   I have discussed case and plan of care in detail with Dr" Hernando and Dr Camargo; Status and plan of care were discussed with team on multidisciplinary rounds.    Critical Care Time: 45 minutes  Critical secondary to saddle PE; Critical care was time spent personally by me on the following activities: development of treatment plan with patient or surrogate and bedside caregivers, discussions with consultants, evaluation of patient's response to treatment, examination of patient, ordering and performing treatments and interventions, ordering and review of laboratory studies, ordering and review of radiographic studies, pulse oximetry, re-evaluation of patient's condition. This critical care time did not overlap with that of any other provider or involve time for any procedures.    Thank you for your consult.      Kim Oshea NP  Critical Care Medicine  [facility] [place]

## 2018-12-20 NOTE — SUBJECTIVE & OBJECTIVE
Past Medical History:   Diagnosis Date    Anxiety     Chronic mental illness     Depression     Pulmonary embolus        Past Surgical History:   Procedure Laterality Date     SECTION         Review of patient's allergies indicates:   Allergen Reactions    Sulfamethoxazole-trimethoprim      Patient says she is Immune to it       Family History     Problem Relation (Age of Onset)    Hyperlipidemia Mother, Father    Kidney disease Father        Tobacco Use    Smoking status: Current Every Day Smoker     Packs/day: 0.50     Types: Cigarettes    Smokeless tobacco: Never Used   Substance and Sexual Activity    Alcohol use: No    Drug use: Yes     Types: Marijuana    Sexual activity: Yes     Partners: Male         Review of Systems   Reason unable to perform ROS: limited responses.   Respiratory: Positive for cough. Negative for shortness of breath.         Denies sputum production today   Cardiovascular: Negative for chest pain and leg swelling.     Objective:     Vital Signs (Most Recent):  Temp: 99.9 °F (37.7 °C) (18 1000)  Pulse: 102 (18 1000)  Resp: (!) 29 (18 1000)  BP: (!) 100/58 (18 1000)  SpO2: 97 % (18 1000) Vital Signs (24h Range):  Temp:  [98.1 °F (36.7 °C)-102.7 °F (39.3 °C)] 99.9 °F (37.7 °C)  Pulse:  [] 102  Resp:  [22-46] 29  SpO2:  [96 %-100 %] 97 %  BP: ()/(50-67) 100/58     Weight: 68 kg (149 lb 14.6 oz)  Body mass index is 25.73 kg/m².      Intake/Output Summary (Last 24 hours) at 2018 1332  Last data filed at 2018 1000  Gross per 24 hour   Intake 271.08 ml   Output 450 ml   Net -178.92 ml       Physical Exam   Constitutional: She is oriented to person, place, and time. No distress.   Thin female resting in bed with NAD   HENT:   Head: Normocephalic and atraumatic.   Eyes: Conjunctivae are normal. Pupils are equal, round, and reactive to light.   Neck: No tracheal deviation present.   Cardiovascular: Normal rate and regular  rhythm.   Pulmonary/Chest: Effort normal and breath sounds normal. No respiratory distress.   Abdominal: Soft. Bowel sounds are normal. She exhibits no distension. There is no tenderness.   Neurological: She is alert and oriented to person, place, and time.   Skin: Skin is warm and dry. Capillary refill takes less than 2 seconds.   Psychiatric: Her speech is normal. Her affect is blunt. She is withdrawn.   Limited psych assessment, blunt affect evasive to questions re past or present health       Vents:       Lines/Drains/Airways     Peripheral Intravenous Line                 Peripheral IV - Single Lumen 12/19/18 2336 Right Antecubital less than 1 day         Peripheral IV - Single Lumen 12/20/18 0153 Left Upper Arm less than 1 day                Significant Labs:    CBC/Anemia Profile:  Recent Labs   Lab 12/19/18  2336 12/20/18  0434 12/20/18  0809   WBC 23.14* 19.14*  17.60* 18.09*   HGB 14.0 11.2*  11.2* 11.3*   HCT 40.2 33.0*  32.7* 32.9*    173  174 187   MCV 83 86  85 84   RDW 12.9 13.0  13.0 13.0        Chemistries:  Recent Labs   Lab 12/19/18  2336 12/20/18  0809 12/20/18  1048    138 137   K 3.6 3.8 3.5   CL 99 105 106   CO2 23 21* 22*   BUN 12 9 9   CREATININE 1.0 0.7 0.7   CALCIUM 9.9 8.6* 8.4*   ALBUMIN 3.8 2.9*  --    PROT 8.6*  --   --    BILITOT 2.0*  --   --    ALKPHOS 69  --   --    ALT 16  --   --    AST 18  --   --    MG  --  1.5*  --    PHOS  --  1.8*  1.8*  --        All pertinent labs within the past 24 hours have been reviewed.    Significant Imaging:   I have reviewed all pertinent imaging results/findings within the past 24 hours.  I have reviewed and interpreted all pertinent imaging results/findings within the past 24 hours.

## 2018-12-20 NOTE — ED NOTES
Patient given ice chips and 2 more warm blankets for comfort. Patient argumentative of care. She verbalizes that staff enjoys stabbing people with needles. Patient's spouse at bedside encouraging patient to calm down. BBS remain clear. St remains on cardiac monitor at 118 bpm. IV site wnl. Will continue to monitor.

## 2018-12-20 NOTE — HPI
28F h/o PE presents with hemoptysis that started 3 days ago.  Associated with SOB and right sided chest wall pain.   Denies fevers, chills, n/v, ab pain, dysuria.  Denies other symptoms.  Reports h/o of PE more than 2 years ago.   In ER, CTA chest preliminary read show acute pulmonary artery with near occlusive thrombus in the right interlobar, lower lobe segmental and upper lobe segmental pulmonary arteries with saddle embolus extending into the distal right main artery, nonocclusive thrombus in left lower lobe pulmonary artery extending into the segmental branches, decreased right lung volume and areas of peripheral consolidation in right lobe consistent with pulmonary infarcts.   CVT surgery consulted in ER and report no need for emergency surgery now since patient his hemodynamically stable.   Recommend anticoagulation and will see in AM.  Hospital medicine called for admission.

## 2018-12-20 NOTE — EICU
RN noted pt refusing meds. Labs  Encouraged to review importance again   May need psychiatry input if not allowing care to progress as pt on IV heparin with risk of bleeding especially with hemoptysis   Requested RN to try and get family involved to assist       0638 Spoke with pt - she agrees for labs and meds now- wanted to avoid frequent interruptions- explained risk of high ptt álvaro with hemoptysis   Can get all labs at 8 am including ptt base don initial start time of 2 am

## 2018-12-20 NOTE — HOSPITAL COURSE
Admitted to ICU on heparin infusion for hemodynamic monitoring while awaiting vascular surgery consult; this morning she is awake and alert, reluctant to answer questions and appears agitated by my presence; she denies SOB and reports CP is currently controlled; she acknowledges having PE in past but will not answer how she was treated at that time, she denies family history of clots; she will not answer any questions regarding anxiety, depression, psych history; reports she has not been taking any home medications; t max overnight 102.7  12/21 Improved, less dyspnea

## 2018-12-20 NOTE — ED NOTES
Patient reports abd pain and requesting pain medication. Patient also has blood streaks noted in phelgm. Patient reports the side pain is intermittent.

## 2018-12-20 NOTE — ED PROVIDER NOTES
Encounter Date: 2018       History     Chief Complaint   Patient presents with    Cough     Reports spitting up blood and RUQ/R lower chest pain. Onset approx 3 days ago. Reports cough, onset yesterday.      Patient currently presents with concern regarding cough.  She notes onset about 3 days ago.  There is associated RIGHT-sided chest wall pain.  Denies fever or chills.  Patient does describe small amounts of hemoptysis this evening.  There is a remote history of bilateral PE though patient denies active anticoagulation.  Patient denies recent trauma, surgery, prolonged travel, or hormone use.  She denies history of prior DVT.  She is a smoker.          Review of patient's allergies indicates:   Allergen Reactions    Sulfamethoxazole-trimethoprim      Patient says she is Immune to it     Past Medical History:   Diagnosis Date    Anxiety     Chronic mental illness     Depression     Pulmonary embolus      Past Surgical History:   Procedure Laterality Date     SECTION       Family History   Problem Relation Age of Onset    Hyperlipidemia Mother     Hyperlipidemia Father     Kidney disease Father      Social History     Tobacco Use    Smoking status: Current Every Day Smoker     Packs/day: 0.50     Types: Cigarettes    Smokeless tobacco: Never Used   Substance Use Topics    Alcohol use: No    Drug use: Yes     Types: Marijuana     Review of Systems   Constitutional: Negative for chills and fever.   HENT: Negative for congestion and rhinorrhea.    Respiratory: Positive for cough and shortness of breath. Negative for chest tightness and wheezing.    Cardiovascular: Negative for chest pain, palpitations and leg swelling.   Gastrointestinal: Negative for abdominal pain, constipation, diarrhea, nausea and vomiting.   Genitourinary: Negative for dysuria, frequency, urgency, vaginal bleeding and vaginal discharge.   Skin: Negative for color change and rash.   Allergic/Immunologic: Negative for  "immunocompromised state.   Neurological: Negative for dizziness, weakness and numbness.   Hematological: Negative for adenopathy. Does not bruise/bleed easily.   All other systems reviewed and are negative.    Physical Exam     Initial Vitals [12/19/18 2320]   BP Pulse Resp Temp SpO2   135/67 (!) 151 (!) 26 98.9 °F (37.2 °C) 96 %      MAP       --         Vitals:    12/19/18 2320 12/19/18 2322 12/20/18 0006 12/20/18 0105   BP: 135/67   118/64   Pulse: (!) 151 (!) 141 (!) 136 (!) 113   Resp: (!) 26  (!) 46 (!) 22   Temp: 98.9 °F (37.2 °C)   100.3 °F (37.9 °C)   TempSrc: Oral   Oral   SpO2: 96%  98% 98%   Weight: 68 kg (149 lb 14.6 oz)      Height: 5' 4" (1.626 m)       12/20/18 0131 12/20/18 0231   BP: 109/62 (!) 101/57   Pulse: 107 101   Resp: (!) 24 (!) 24   Temp:     TempSrc:     SpO2: 99% 100%   Weight:     Height:         Physical Exam    Nursing note and vitals reviewed.  Constitutional: She appears well-developed and well-nourished. She is not diaphoretic. No distress.   HENT:   Head: Normocephalic and atraumatic.   Right Ear: External ear normal.   Left Ear: External ear normal.   Nose: Nose normal.   Mouth/Throat: Oropharynx is clear and moist.   Eyes: Conjunctivae and EOM are normal. Pupils are equal, round, and reactive to light. No scleral icterus.   Neck: Neck supple. No tracheal deviation present. No JVD present.   Cardiovascular: Regular rhythm, normal heart sounds and intact distal pulses. Tachycardia present.  Exam reveals no gallop and no friction rub.    No murmur heard.  Pulmonary/Chest: No accessory muscle usage. Tachypnea noted. No respiratory distress. She has decreased breath sounds in the right middle field and the right lower field. She has no wheezes. She has no rhonchi. She has no rales.   Abdominal: Soft. Bowel sounds are normal. She exhibits no distension. There is no tenderness.   Musculoskeletal: Normal range of motion. She exhibits no edema.   Neurological: She is alert and oriented " to person, place, and time. She has normal strength. No cranial nerve deficit or sensory deficit. GCS score is 15. GCS eye subscore is 4. GCS verbal subscore is 5. GCS motor subscore is 6.   Skin: Skin is warm and dry. No rash noted.   Psychiatric: Her speech is normal. Judgment and thought content normal. Her mood appears anxious. She is agitated. She is not actively hallucinating. Cognition and memory are normal. She is attentive.       ED Course   Critical Care  Date/Time: 12/20/2018 2:35 AM  Performed by: Eliud Souza MD  Authorized by: Eliud Souza MD   Direct patient critical care time: 20 minutes  Ordering / reviewing critical care time: 8 minutes  Documentation critical care time: 8 minutes  Consulting other physicians critical care time: 12 minutes  Total critical care time (exclusive of procedural time) : 48 minutes  Critical care time was exclusive of separately billable procedures and treating other patients.  Critical care was necessary to treat or prevent imminent or life-threatening deterioration of the following conditions: circulatory failure.  Critical care was time spent personally by me on the following activities: discussions with consultants, pulse oximetry, re-evaluation of patient's condition, ordering and review of radiographic studies, ordering and review of laboratory studies, ordering and performing treatments and interventions, evaluation of patient's response to treatment, development of treatment plan with patient or surrogate and examination of patient.        Labs Reviewed   COMPREHENSIVE METABOLIC PANEL - Abnormal; Notable for the following components:       Result Value    Glucose 124 (*)     Total Protein 8.6 (*)     Total Bilirubin 2.0 (*)     All other components within normal limits   CBC W/ AUTO DIFFERENTIAL - Abnormal; Notable for the following components:    WBC 23.14 (*)     Gran # (ANC) 17.8 (*)     Mono # 2.4 (*)     Gran% 77.3 (*)     Lymph% 12.3 (*)      Rouleaux Present (*)     All other components within normal limits   URINALYSIS, REFLEX TO URINE CULTURE - Abnormal; Notable for the following components:    Color, UA Orange (*)     Protein, UA Trace (*)     Ketones, UA 2+ (*)     Bilirubin (UA) 1+ (*)     Occult Blood UA Trace (*)     Urobilinogen, UA 4.0-6.0 (*)     All other components within normal limits    Narrative:     Preferred Collection Type->Urine, Clean Catch   CULTURE, BLOOD   CULTURE, BLOOD   B-TYPE NATRIURETIC PEPTIDE   TROPONIN I   DRUG SCREEN PANEL, URINE EMERGENCY    Narrative:     Preferred Collection Type->Urine, Clean Catch   PREGNANCY TEST, URINE RAPID   LACTIC ACID, PLASMA   APTT   PROTIME-INR   APTT   PROTIME-INR   APTT   CBC W/ AUTO DIFFERENTIAL     EKG Readings: (Independently Interpreted)   Initial Reading: No STEMI. Rhythm: Sinus Tachycardia. Heart Rate: 137. Ectopy: No Ectopy. Axis: Normal.       Imaging Results          CTA Chest Non-Coronary (PE Study) (In process)                X-Ray Chest AP Portable (Final result)  Result time 12/19/18 23:40:49    Final result by Damien Atkins MD (12/19/18 23:40:49)                 Impression:      Increased density at the right lung base.  Probable right lower lobe infiltrate/pneumonia with fluid in the major fissure on the right side and possible small right-sided pleural effusion.      Electronically signed by: Damien Atkins MD  Date:    12/19/2018  Time:    23:40             Narrative:    EXAMINATION:  XR CHEST AP PORTABLE    CLINICAL HISTORY:  SOB, Chest Pain;    TECHNIQUE:  Single frontal view of the chest was performed.    COMPARISON:  None    FINDINGS:  Increased density at the right lung base consistent with a right lower lobe infiltrate/pneumonia.  Cannot exclude a small right-sided pleural effusion with fluid tracking in the major fissure.    The cardiac silhouette is upper limits of normal in size.  The hilar and mediastinal contours are unremarkable.                                        Medical Decision Making:   ED Management:  Pertinent details of the encounter were discussed with hematologist Dr. Muñoz who feels that conservative anticoagulation with a heparin infusion is appropriate despite hemoptysis but agrees that we should defer the initial heparin bolus.  This would allow us to terminate the infusion should the patient develop more significant hemoptysis.    Given the extensive nature of this process, we elected to also discuss the case with Dr. Small of cardiovascular surgery.  He agrees with our plan of management with anticoagulation.  Given the patient's current hemodynamic stability, satisfactory oxygenation, and lack of clinical signs for cor pulmonale; he does not feel more invasive measures are warranted at this time.    All historical, clinical, radiographic, and laboratory findings were reviewed with the patient/family in detail along with the indications for transport to the facility in [place] in order to receive heparin infusion, cardiac monitoring, cardiovascular surgery consult, and hematology consult.  All remaining questions and concerns were addressed at this time and the patient/family communicates understanding and agrees to proceed accordingly.  Similarly, all pertinent details of the encounter were discussed with Dr. Del Rosario who agrees to receive the patient at [facility] for further care as outlined above.  The patient will be transferred by [facility] ambulance services secondary to a need for ongoing cardiac monitoring and heparin infusion en route.  Eliud Souza MD  2:31 AM                          Clinical Impression:   The primary encounter diagnosis was Acute saddle pulmonary embolism without acute cor pulmonale. Diagnoses of Pulmonary infarction, Hemoptysis, and Tobacco abuse were also pertinent to this visit.                             Eliud Souza MD  12/20/18 5770       Eliud Souza MD  12/20/18 9121

## 2018-12-20 NOTE — ED NOTES
Pt awake and alert at this time. Resting comfortably.  left bedside. Pt updated on POC. No further questions or concerns expressed by pt.

## 2018-12-20 NOTE — H&P
[facility] Hospital Medicine  History & Physical    Patient Name: Nimo Mahan  MRN: [id number]  Admission Date: 2018          Attending Physician: Toño Del Rosario MD   Primary Care Provider: Primary Doctor No         Patient information was obtained from patient and ER records.     Subjective:     Principal Problem:Acute saddle pulmonary embolism without acute cor pulmonale    Chief Complaint:   Chief Complaint   Patient presents with    Cough     Reports spitting up blood and RUQ/R lower chest pain. Onset approx 3 days ago. Reports cough, onset yesterday.         HPI: 28F h/o PE presents with hemoptysis that started 3 days ago.  Associated with SOB and right sided chest wall pain.   Denies fevers, chills, n/v, ab pain, dysuria.  Denies other symptoms.  Reports h/o of PE more than 2 years ago.   In ER, CTA chest preliminary read show acute pulmonary artery with near occlusive thrombus in the right interlobar, lower lobe segmental and upper lobe segmental pulmonary arteries with saddle embolus extending into the distal right main artery, nonocclusive thrombus in left lower lobe pulmonary artery extending into the segmental branches, decreased right lung volume and areas of peripheral consolidation in right lobe consistent with pulmonary infarcts.   CVT surgery consulted in ER and report no need for emergency surgery now since patient his hemodynamically stable.   Recommend anticoagulation and will see in AM.  Hospital medicine called for admission.       Past Medical History:   Diagnosis Date    Anxiety     Chronic mental illness     Depression     Pulmonary embolus        Past Surgical History:   Procedure Laterality Date     SECTION         Review of patient's allergies indicates:   Allergen Reactions    Sulfamethoxazole-trimethoprim      Patient says she is Immune to it       No current facility-administered medications on file prior to encounter.      Current Outpatient  Medications on File Prior to Encounter   Medication Sig    ALPRAZolam (XANAX) 0.5 MG tablet Take 0.5 mg by mouth 2 (two) times daily as needed.     escitalopram oxalate (LEXAPRO) 20 MG tablet Take 20 mg by mouth once daily.    haloperidol (HALDOL) 10 MG tablet Take 10 mg by mouth every evening.     risperiDONE (RISPERDAL) 2 MG tablet Take 2 mg by mouth 2 (two) times daily.    sertraline (ZOLOFT) 100 MG tablet Take 100 mg by mouth once daily.     Family History     Problem Relation (Age of Onset)    Hyperlipidemia Mother, Father    Kidney disease Father        Tobacco Use    Smoking status: Current Every Day Smoker     Packs/day: 0.50     Types: Cigarettes    Smokeless tobacco: Never Used   Substance and Sexual Activity    Alcohol use: No    Drug use: Yes     Types: Marijuana    Sexual activity: Yes     Partners: Male     Review of Systems   Constitutional: Negative for chills, diaphoresis, fatigue, fever and unexpected weight change.   HENT: Negative for congestion, facial swelling, sore throat and trouble swallowing.    Eyes: Negative for photophobia, redness and visual disturbance.   Respiratory: Positive for cough (hemoptysis) and shortness of breath. Negative for apnea, chest tightness and wheezing.    Cardiovascular: Negative for chest pain, palpitations and leg swelling.   Gastrointestinal: Negative for abdominal distention, abdominal pain, blood in stool, constipation, diarrhea, nausea and vomiting.   Endocrine: Negative for polydipsia, polyphagia and polyuria.   Genitourinary: Negative for difficulty urinating, dysuria, flank pain, frequency, hematuria and urgency.   Musculoskeletal: Negative for arthralgias, back pain, joint swelling, myalgias and neck stiffness.   Skin: Negative for pallor and rash.   Allergic/Immunologic: Negative for immunocompromised state.   Neurological: Negative for dizziness, tremors, syncope, weakness, light-headedness and headaches.   Psychiatric/Behavioral: Negative for  agitation, confusion and suicidal ideas.   All other systems reviewed and are negative.    Objective:     Vital Signs (Most Recent):  Temp: 98.1 °F (36.7 °C) (12/20/18 0415)  Pulse: 95 (12/20/18 0430)  Resp: (!) 30 (12/20/18 0430)  BP: (!) 91/59 (12/20/18 0415)  SpO2: 99 % (12/20/18 0430) Vital Signs (24h Range):  Temp:  [98.1 °F (36.7 °C)-100.3 °F (37.9 °C)] 98.1 °F (36.7 °C)  Pulse:  [] 95  Resp:  [22-46] 30  SpO2:  [96 %-100 %] 99 %  BP: ()/(50-67) 91/59     Weight: 68 kg (149 lb 14.6 oz)  Body mass index is 25.73 kg/m².    Physical Exam   Constitutional: She is oriented to person, place, and time. She appears well-developed and well-nourished. No distress.   HENT:   Head: Normocephalic and atraumatic.   Mouth/Throat: Oropharynx is clear and moist.   Eyes: Conjunctivae and EOM are normal. Pupils are equal, round, and reactive to light. No scleral icterus.   Neck: Normal range of motion. Neck supple. No JVD present. No thyromegaly present.   Cardiovascular: Normal rate, regular rhythm and intact distal pulses. Exam reveals no gallop and no friction rub.   No murmur heard.  Pulmonary/Chest: Effort normal. No respiratory distress. She has no wheezes. She has rales (bilateral diffuse). She exhibits no tenderness.   Abdominal: Soft. Bowel sounds are normal. She exhibits no distension and no mass. There is no tenderness. There is no guarding.   Musculoskeletal: Normal range of motion. She exhibits no tenderness.   Neurological: She is alert and oriented to person, place, and time. No cranial nerve deficit.   Skin: Skin is warm and dry. Capillary refill takes less than 2 seconds. No rash noted. She is not diaphoretic. No erythema.   Psychiatric: She has a normal mood and affect.   Nursing note and vitals reviewed.        CRANIAL NERVES     CN III, IV, VI   Pupils are equal, round, and reactive to light.  Extraocular motions are normal.        Significant Labs:   CBC:   Recent Labs   Lab 12/19/18  2338  12/20/18  0434   WBC 23.14* 17.60*   HGB 14.0 11.2*   HCT 40.2 32.7*    174     CMP:   Recent Labs   Lab 12/19/18  2336      K 3.6   CL 99   CO2 23   *   BUN 12   CREATININE 1.0   CALCIUM 9.9   PROT 8.6*   ALBUMIN 3.8   BILITOT 2.0*   ALKPHOS 69   AST 18   ALT 16   ANIONGAP 14   EGFRNONAA >60.0     Magnesium: No results for input(s): MG in the last 48 hours.  Urine Studies:   Recent Labs   Lab 12/19/18  2357   COLORU Casey*   APPEARANCEUA Clear   PHUR 6.0   SPECGRAV 1.020   PROTEINUA Trace*   GLUCUA Negative   KETONESU 2+*   BILIRUBINUA 1+*   OCCULTUA Trace*   NITRITE Negative   UROBILINOGEN 4.0-6.0*   LEUKOCYTESUR Negative     All pertinent labs within the past 24 hours have been reviewed.    Significant Imaging: I have reviewed all pertinent imaging results/findings within the past 24 hours.   Imaging Results          CTA Chest Non-Coronary (PE Study) (In process)                X-Ray Chest AP Portable (Final result)  Result time 12/19/18 23:40:49    Final result by Damien Atkins MD (12/19/18 23:40:49)                 Impression:      Increased density at the right lung base.  Probable right lower lobe infiltrate/pneumonia with fluid in the major fissure on the right side and possible small right-sided pleural effusion.      Electronically signed by: Damien Atkins MD  Date:    12/19/2018  Time:    23:40             Narrative:    EXAMINATION:  XR CHEST AP PORTABLE    CLINICAL HISTORY:  SOB, Chest Pain;    TECHNIQUE:  Single frontal view of the chest was performed.    COMPARISON:  None    FINDINGS:  Increased density at the right lung base consistent with a right lower lobe infiltrate/pneumonia.  Cannot exclude a small right-sided pleural effusion with fluid tracking in the major fissure.    The cardiac silhouette is upper limits of normal in size.  The hilar and mediastinal contours are unremarkable.                                      Assessment/Plan:     * Acute saddle pulmonary embolism  without acute cor pulmonale    - IV heparin bolus/gtt per PE protocol  - continuous oxygen  - monitor overnight in ICU  - keep NPO for possible surgery in AM  - Order TTE in AM to evaluate right heart strain  - tessalon pearls prn cough         VTE Risk Mitigation (From admission, onward)        Ordered     heparin 25,000 units in dextrose 5% 250 mL (100 units/mL) infusion HIGH INTENSITY nomogram - [facility]  Continuous      12/20/18 0406     heparin 25,000 units in dextrose 5% (100 units/ml) IV bolus from bag - ADDITIONAL PRN BOLUS - 30 units/kg  As needed (PRN)      12/20/18 0406     heparin 25,000 units in dextrose 5% (100 units/ml) IV bolus from bag - ADDITIONAL PRN BOLUS - 60 units/kg  As needed (PRN)      12/20/18 0406     Place FERNANDO hose  Until discontinued      12/20/18 0409     Place sequential compression device  Until discontinued      12/20/18 0409     Reason for No Pharmacological VTE Prophylaxis  Once      12/20/18 0409     IP VTE HIGH RISK PATIENT  Once      12/20/18 0409        Critical care time spent on the evaluation and treatment of severe organ dysfunction, review of pertinent labs and imaging studies, discussions with consulting providers and discussions with patient/family: 45 minutes.     Toño Del Rosario MD  Department of Hospital Medicine   [facility]

## 2018-12-20 NOTE — EICU
Bedside nurse called to report pt refusing oral meds  And lab sticks. Informed Dr. Barrios.  He asked what is due. Told him kcl 20meq po.

## 2018-12-20 NOTE — SUBJECTIVE & OBJECTIVE
Past Medical History:   Diagnosis Date    Anxiety     Chronic mental illness     Depression     Pulmonary embolus        Past Surgical History:   Procedure Laterality Date     SECTION         Review of patient's allergies indicates:   Allergen Reactions    Sulfamethoxazole-trimethoprim      Patient says she is Immune to it       No current facility-administered medications on file prior to encounter.      Current Outpatient Medications on File Prior to Encounter   Medication Sig    ALPRAZolam (XANAX) 0.5 MG tablet Take 0.5 mg by mouth 2 (two) times daily as needed.     escitalopram oxalate (LEXAPRO) 20 MG tablet Take 20 mg by mouth once daily.    haloperidol (HALDOL) 10 MG tablet Take 10 mg by mouth every evening.     risperiDONE (RISPERDAL) 2 MG tablet Take 2 mg by mouth 2 (two) times daily.    sertraline (ZOLOFT) 100 MG tablet Take 100 mg by mouth once daily.     Family History     Problem Relation (Age of Onset)    Hyperlipidemia Mother, Father    Kidney disease Father        Tobacco Use    Smoking status: Current Every Day Smoker     Packs/day: 0.50     Types: Cigarettes    Smokeless tobacco: Never Used   Substance and Sexual Activity    Alcohol use: No    Drug use: Yes     Types: Marijuana    Sexual activity: Yes     Partners: Male     Review of Systems   Constitutional: Negative for chills, diaphoresis, fatigue, fever and unexpected weight change.   HENT: Negative for congestion, facial swelling, sore throat and trouble swallowing.    Eyes: Negative for photophobia, redness and visual disturbance.   Respiratory: Positive for cough (hemoptysis) and shortness of breath. Negative for apnea, chest tightness and wheezing.    Cardiovascular: Negative for chest pain, palpitations and leg swelling.   Gastrointestinal: Negative for abdominal distention, abdominal pain, blood in stool, constipation, diarrhea, nausea and vomiting.   Endocrine: Negative for polydipsia, polyphagia and polyuria.    Genitourinary: Negative for difficulty urinating, dysuria, flank pain, frequency, hematuria and urgency.   Musculoskeletal: Negative for arthralgias, back pain, joint swelling, myalgias and neck stiffness.   Skin: Negative for pallor and rash.   Allergic/Immunologic: Negative for immunocompromised state.   Neurological: Negative for dizziness, tremors, syncope, weakness, light-headedness and headaches.   Psychiatric/Behavioral: Negative for agitation, confusion and suicidal ideas.   All other systems reviewed and are negative.    Objective:     Vital Signs (Most Recent):  Temp: 98.1 °F (36.7 °C) (12/20/18 0415)  Pulse: 95 (12/20/18 0430)  Resp: (!) 30 (12/20/18 0430)  BP: (!) 91/59 (12/20/18 0415)  SpO2: 99 % (12/20/18 0430) Vital Signs (24h Range):  Temp:  [98.1 °F (36.7 °C)-100.3 °F (37.9 °C)] 98.1 °F (36.7 °C)  Pulse:  [] 95  Resp:  [22-46] 30  SpO2:  [96 %-100 %] 99 %  BP: ()/(50-67) 91/59     Weight: 68 kg (149 lb 14.6 oz)  Body mass index is 25.73 kg/m².    Physical Exam   Constitutional: She is oriented to person, place, and time. She appears well-developed and well-nourished. No distress.   HENT:   Head: Normocephalic and atraumatic.   Mouth/Throat: Oropharynx is clear and moist.   Eyes: Conjunctivae and EOM are normal. Pupils are equal, round, and reactive to light. No scleral icterus.   Neck: Normal range of motion. Neck supple. No JVD present. No thyromegaly present.   Cardiovascular: Normal rate, regular rhythm and intact distal pulses. Exam reveals no gallop and no friction rub.   No murmur heard.  Pulmonary/Chest: Effort normal. No respiratory distress. She has no wheezes. She has rales (bilateral diffuse). She exhibits no tenderness.   Abdominal: Soft. Bowel sounds are normal. She exhibits no distension and no mass. There is no tenderness. There is no guarding.   Musculoskeletal: Normal range of motion. She exhibits no tenderness.   Neurological: She is alert and oriented to person,  place, and time. No cranial nerve deficit.   Skin: Skin is warm and dry. Capillary refill takes less than 2 seconds. No rash noted. She is not diaphoretic. No erythema.   Psychiatric: She has a normal mood and affect.   Nursing note and vitals reviewed.        CRANIAL NERVES     CN III, IV, VI   Pupils are equal, round, and reactive to light.  Extraocular motions are normal.        Significant Labs:   CBC:   Recent Labs   Lab 12/19/18  2336 12/20/18  0434   WBC 23.14* 17.60*   HGB 14.0 11.2*   HCT 40.2 32.7*    174     CMP:   Recent Labs   Lab 12/19/18  2336      K 3.6   CL 99   CO2 23   *   BUN 12   CREATININE 1.0   CALCIUM 9.9   PROT 8.6*   ALBUMIN 3.8   BILITOT 2.0*   ALKPHOS 69   AST 18   ALT 16   ANIONGAP 14   EGFRNONAA >60.0     Magnesium: No results for input(s): MG in the last 48 hours.  Urine Studies:   Recent Labs   Lab 12/19/18  2357   COLORU [place]*   APPEARANCEUA Clear   PHUR 6.0   SPECGRAV 1.020   PROTEINUA Trace*   GLUCUA Negative   KETONESU 2+*   BILIRUBINUA 1+*   OCCULTUA Trace*   NITRITE Negative   UROBILINOGEN 4.0-6.0*   LEUKOCYTESUR Negative     All pertinent labs within the past 24 hours have been reviewed.    Significant Imaging: I have reviewed all pertinent imaging results/findings within the past 24 hours.   Imaging Results          CTA Chest Non-Coronary (PE Study) (In process)                X-Ray Chest AP Portable (Final result)  Result time 12/19/18 23:40:49    Final result by Damien Atkins MD (12/19/18 23:40:49)                 Impression:      Increased density at the right lung base.  Probable right lower lobe infiltrate/pneumonia with fluid in the major fissure on the right side and possible small right-sided pleural effusion.      Electronically signed by: Damien Atkins MD  Date:    12/19/2018  Time:    23:40             Narrative:    EXAMINATION:  XR CHEST AP PORTABLE    CLINICAL HISTORY:  SOB, Chest Pain;    TECHNIQUE:  Single frontal view of the chest was  performed.    COMPARISON:  None    FINDINGS:  Increased density at the right lung base consistent with a right lower lobe infiltrate/pneumonia.  Cannot exclude a small right-sided pleural effusion with fluid tracking in the major fissure.    The cardiac silhouette is upper limits of normal in size.  The hilar and mediastinal contours are unremarkable.

## 2018-12-20 NOTE — ASSESSMENT & PLAN NOTE
- IV heparin bolus/gtt per PE protocol  - continuous oxygen  - monitor overnight in ICU  - keep NPO for possible surgery in AM  - Order TTE in AM to evaluate right heart strain  - tessalon pearls prn cough

## 2018-12-20 NOTE — ASSESSMENT & PLAN NOTE
With fever  Suspect reative s/t pulmonary infarcts but cannot rule out infectious process  Empiric abx for pulmonary coverage added by attending  Blood cultures sent  UA without leukocytes, nitrites

## 2018-12-20 NOTE — EICU
Bedside nurse called to see if Dr. Barrios wants to PEC pt.  reports she has had to be pec'd in past. Informed Dr. Barrios. He is calling unit to speak to nurse.

## 2018-12-20 NOTE — NURSING
RN called to bedside by . Patient refusing labs. Patient refused ABG. Yelling at RN. Patient educated on the importance of the labs being drawn. And educated on the reasoning for these labs. Patient refusing medication. Patient yelling. No signs of understanding. MD aware. Will cont to monitor

## 2018-12-20 NOTE — PLAN OF CARE
"Assessment completed.  Met with the patient/ family. CM explained and left info in blue transition of care folder regarding Advance Directives, Living Will ( FULL CODE or DNR) ,  Smoking Cessation- smokes 1/2 ppd and refused to quit , Pamphlet on D/C planning on admission and Pharmacy bedside delivery.  The role of CM explained for ICU transitions of care/ discharge planning. Per EMR, 28F h/o PE presents with hemoptysis that started 3 days ago.  Associated with SOB and right sided chest wall pain.    Reports h/o of PE more than 2 years ago.   In ER, CTA chest preliminary read show acute pulmonary artery with near occlusive thrombus in the right interlobar, lower lobe segmental and upper lobe segmental pulmonary arteries with saddle embolus extending into the distal right main artery, nonocclusive thrombus in left lower lobe pulmonary artery extending into the segmental branches, decreased right lung volume and areas of peripheral consolidation in right lobe consistent with pulmonary infarcts.    Recommend anticoagulation and will see in AM.    Patient has family support of  her spouse / family.  Patient stated she does take Xanax and Haldol when she needs to. She then became agitated and slow to respond to questions. Her spouse answered all medical questions but when it came to psych hx he turned his head away. Patient has "NO" insurance. CM asked if she did not re-applied since she has had Medicaid previously . She just looked at me with anger. Patient plans to return home post d/c. Patient has no needs at this time. CM asked pharmacy to ck on epic medical medication hx. Patient has at least 4 psych meds Xanax,  Lexapro, Haldol ,Risperdol , Zoloft. She only mentioned Xanax and Haldol.   CM to f/u for safe transition    Primary Doctor No     No Pharmacies Listed       12/20/18 9917   Discharge Assessment   Assessment Type Discharge Planning Assessment   Confirmed/corrected address and phone number on facesheet? Yes "   Assessment information obtained from? Patient;Caregiver;Medical Record   Expected Length of Stay (days) (TBD)   Communicated expected length of stay with patient/caregiver no   Prior to hospitilization cognitive status: Alert/Oriented   Prior to hospitalization functional status: Independent   Current cognitive status: Alert/Oriented   Current Functional Status: Independent   Lives With spouse;child(river), dependent   Able to Return to Prior Arrangements yes   Is patient able to care for self after discharge? Yes   Who are your caregiver(s) and their phone number(s)? (Todd Hernandez ( spouse ) [phone])   Patient's perception of discharge disposition home or selfcare   Readmission Within the Last 30 Days no previous admission in last 30 days   Patient currently being followed by outpatient case management? No   Patient currently receives any other outside agency services? No   Equipment Currently Used at Home none   Do you have any problems affording any of your prescribed medications? No   Is the patient taking medications as prescribed? yes  (According to the patient)   Does the patient have transportation home? Yes   Transportation Anticipated family or friend will provide   Does the patient receive services at the Coumadin Clinic? No   Discharge Plan A Home with family   Discharge Plan B Home with family   Patient/Family in Agreement with Plan yes
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
## 2018-12-20 NOTE — ASSESSMENT & PLAN NOTE
"2nd diagnosis, unprovoked PE  Will not discuss prior treatment or any work up for cause but states "I don't have a clotting disorder"  Clotting in addition to recent wt loss does raise some suspicion for malignancy although possible that wt loss may be s/t paranoid/psych disorder  Continue heparin infusion for full anticoagulation  Vascular consult for ?intervention pending  Echo to r/o rt heart strain pending  Continue ICU hemodynamic monitoring  "

## 2018-12-20 NOTE — PLAN OF CARE
Problem: Adult Inpatient Plan of Care  Goal: Plan of Care Review  Outcome: Ongoing (interventions implemented as appropriate)   Patient transferred via EMS from [facility]. Plan of care reviewed. VSS. AAOx3. Room air. NSR on monitor. No complaints of pain at this time. Patient on bedrest. Heparin gtt per orders. Heparin changed from low to high intensity per orders.  Questions answers. Patient coughing up scant amounts of dark red bloody sputum. PIVs intact. Dressing clean dry and intact. Turn every two hours to prevent skin breakdown. Fall precautions in place. Will cont to monitor

## 2018-12-20 NOTE — NURSING
RN notified  of patient refusing care and the severity of her diagnosis.  to speak to patient. After discussion and more education to patient regarding plan of care. Patient no signs of understanding. Yelling at RN. CARLTON contacted and MD aware. Will cont to monitor.

## 2018-12-20 NOTE — EICU
"eICU Note    Subjective: Cough with chest pain       Objective:Awake  Good oxygenation   BP (!) 91/59 (BP Location: Right arm, Patient Position: Lying)   Pulse 95   Temp 98.1 °F (36.7 °C) (Oral)   Resp (!) 30   Ht 5' 4" (1.626 m)   Wt 68 kg (149 lb 14.6 oz)   LMP  (Within Weeks)   SpO2 99%   Breastfeeding? No   BMI 25.73 kg/m²     Data review done   BNP nl  Trop I nl  K 3.6   Video review done       Assessment:  Saddle pul embolism with pul infarct   Secondary infection possible    Plan:  1 Heparin IV; workup for recurrent PE-hematologist was called by ER  2 Antibiotics ordered  3 Replete K     DVT prophylaxis on heparin iv   GI prophylaxis ppi  Ventilator settings na    Communication with RN    "

## 2018-12-21 PROBLEM — D57.3 SICKLE CELL TRAIT: Status: ACTIVE | Noted: 2018-12-21

## 2018-12-21 PROBLEM — F29 UNSPECIFIED PSYCHOSIS NOT DUE TO A SUBSTANCE OR KNOWN PHYSIOLOGICAL CONDITION: Status: ACTIVE | Noted: 2018-07-15

## 2018-12-21 LAB
ALBUMIN SERPL BCP-MCNC: 2.5 G/DL
ANION GAP SERPL CALC-SCNC: 12 MMOL/L
APTT BLDCRRT: 41.9 SEC
BASOPHILS # BLD AUTO: 0.01 K/UL
BASOPHILS NFR BLD: 0.1 %
BUN SERPL-MCNC: 6 MG/DL
CALCIUM SERPL-MCNC: 8.5 MG/DL
CHLORIDE SERPL-SCNC: 104 MMOL/L
CO2 SERPL-SCNC: 22 MMOL/L
CREAT SERPL-MCNC: 0.6 MG/DL
DIFFERENTIAL METHOD: ABNORMAL
EOSINOPHIL # BLD AUTO: 0 K/UL
EOSINOPHIL NFR BLD: 0 %
ERYTHROCYTE [DISTWIDTH] IN BLOOD BY AUTOMATED COUNT: 12.8 %
EST. GFR  (AFRICAN AMERICAN): >60 ML/MIN/1.73 M^2
EST. GFR  (NON AFRICAN AMERICAN): >60 ML/MIN/1.73 M^2
GLUCOSE SERPL-MCNC: 82 MG/DL
HCT VFR BLD AUTO: 29.7 %
HGB BLD-MCNC: 10.1 G/DL
LYMPHOCYTES # BLD AUTO: 1.7 K/UL
LYMPHOCYTES NFR BLD: 10.9 %
MAGNESIUM SERPL-MCNC: 1.4 MG/DL
MCH RBC QN AUTO: 28.5 PG
MCHC RBC AUTO-ENTMCNC: 34 G/DL
MCV RBC AUTO: 84 FL
MONOCYTES # BLD AUTO: 1.4 K/UL
MONOCYTES NFR BLD: 8.6 %
NEUTROPHILS # BLD AUTO: 12.6 K/UL
NEUTROPHILS NFR BLD: 80.4 %
PHOSPHATE SERPL-MCNC: 1.6 MG/DL
PHOSPHATE SERPL-MCNC: 1.6 MG/DL
PLATELET # BLD AUTO: 198 K/UL
PMV BLD AUTO: 10.9 FL
POTASSIUM SERPL-SCNC: 3.1 MMOL/L
RBC # BLD AUTO: 3.54 M/UL
SODIUM SERPL-SCNC: 138 MMOL/L
WBC # BLD AUTO: 15.66 K/UL

## 2018-12-21 PROCEDURE — 94761 N-INVAS EAR/PLS OXIMETRY MLT: CPT

## 2018-12-21 PROCEDURE — 85730 THROMBOPLASTIN TIME PARTIAL: CPT

## 2018-12-21 PROCEDURE — 80069 RENAL FUNCTION PANEL: CPT

## 2018-12-21 PROCEDURE — 25000003 PHARM REV CODE 250: Performed by: NURSE PRACTITIONER

## 2018-12-21 PROCEDURE — 83735 ASSAY OF MAGNESIUM: CPT

## 2018-12-21 PROCEDURE — 21400001 HC TELEMETRY ROOM

## 2018-12-21 PROCEDURE — 63600175 PHARM REV CODE 636 W HCPCS: Performed by: INTERNAL MEDICINE

## 2018-12-21 PROCEDURE — 85025 COMPLETE CBC W/AUTO DIFF WBC: CPT

## 2018-12-21 PROCEDURE — 99233 SBSQ HOSP IP/OBS HIGH 50: CPT | Mod: ,,, | Performed by: INTERNAL MEDICINE

## 2018-12-21 PROCEDURE — 63600175 PHARM REV CODE 636 W HCPCS: Performed by: HOSPITALIST

## 2018-12-21 PROCEDURE — C9113 INJ PANTOPRAZOLE SODIUM, VIA: HCPCS | Performed by: HOSPITALIST

## 2018-12-21 PROCEDURE — 25000003 PHARM REV CODE 250: Performed by: HOSPITALIST

## 2018-12-21 PROCEDURE — 25000003 PHARM REV CODE 250: Performed by: INTERNAL MEDICINE

## 2018-12-21 PROCEDURE — 36415 COLL VENOUS BLD VENIPUNCTURE: CPT

## 2018-12-21 RX ORDER — LANOLIN ALCOHOL/MO/W.PET/CERES
400 CREAM (GRAM) TOPICAL 2 TIMES DAILY
Status: DISCONTINUED | OUTPATIENT
Start: 2018-12-21 | End: 2018-12-22 | Stop reason: HOSPADM

## 2018-12-21 RX ORDER — SODIUM,POTASSIUM PHOSPHATES 280-250MG
2 POWDER IN PACKET (EA) ORAL
Status: DISCONTINUED | OUTPATIENT
Start: 2018-12-21 | End: 2018-12-22 | Stop reason: HOSPADM

## 2018-12-21 RX ADMIN — ACETAMINOPHEN 650 MG: 325 TABLET ORAL at 08:12

## 2018-12-21 RX ADMIN — PIPERACILLIN SODIUM AND TAZOBACTAM SODIUM 4.5 G: 4; .5 INJECTION, POWDER, LYOPHILIZED, FOR SOLUTION INTRAVENOUS at 05:12

## 2018-12-21 RX ADMIN — POTASSIUM, SODIUM PHOSPHATES 280 MG-160 MG-250 MG ORAL POWDER PACKET 2 PACKET: POWDER IN PACKET at 08:12

## 2018-12-21 RX ADMIN — POTASSIUM, SODIUM PHOSPHATES 280 MG-160 MG-250 MG ORAL POWDER PACKET 2 PACKET: POWDER IN PACKET at 05:12

## 2018-12-21 RX ADMIN — HALOPERIDOL 2 MG: 1 TABLET ORAL at 08:12

## 2018-12-21 RX ADMIN — ACETAMINOPHEN 650 MG: 325 TABLET ORAL at 03:12

## 2018-12-21 RX ADMIN — PANTOPRAZOLE SODIUM 40 MG: 40 INJECTION, POWDER, FOR SOLUTION INTRAVENOUS at 09:12

## 2018-12-21 RX ADMIN — BENZONATATE 100 MG: 100 CAPSULE ORAL at 12:12

## 2018-12-21 RX ADMIN — PIPERACILLIN SODIUM AND TAZOBACTAM SODIUM 4.5 G: 4; .5 INJECTION, POWDER, LYOPHILIZED, FOR SOLUTION INTRAVENOUS at 12:12

## 2018-12-21 RX ADMIN — PIPERACILLIN SODIUM AND TAZOBACTAM SODIUM 4.5 G: 4; .5 INJECTION, POWDER, LYOPHILIZED, FOR SOLUTION INTRAVENOUS at 09:12

## 2018-12-21 RX ADMIN — KETOROLAC TROMETHAMINE 10 MG: 10 TABLET, FILM COATED ORAL at 04:12

## 2018-12-21 RX ADMIN — HEPARIN SODIUM AND DEXTROSE 18 UNITS/KG/HR: 10000; 5 INJECTION INTRAVENOUS at 10:12

## 2018-12-21 RX ADMIN — MAGNESIUM OXIDE TAB 400 MG (241.3 MG ELEMENTAL MG) 400 MG: 400 (241.3 MG) TAB at 05:12

## 2018-12-21 RX ADMIN — HALOPERIDOL 2 MG: 1 TABLET ORAL at 09:12

## 2018-12-21 RX ADMIN — HALOPERIDOL 2 MG: 1 TABLET ORAL at 05:12

## 2018-12-21 NOTE — ASSESSMENT & PLAN NOTE
- IV heparin bolus/gtt per PE protocol  - continuous oxygen  - Order TTE in AM to evaluate right heart strain - negative for right heart strain  - tessalon pearls prn cough  Pt needs to be discharged on Coumadin with Lovenox bridge
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
## 2018-12-21 NOTE — NURSING
Pt's HR reading jumped up into the 150s & 160s. Pt was having a coughing spell & also c/o difficulty breathing. Vital signs were taken. Sp02 91% on room air initially then increased to 96% room air. BP stable. HR began trending down once pt was beginning to calm herself down. Temp 103.1 orally. Pt having c/o pain under right breast. Will medicate w/ PRN Tylenol for fever & PRN Toradol for pain. Will continue to monitor pt. Shala Cohen RN

## 2018-12-21 NOTE — ASSESSMENT & PLAN NOTE
"2nd diagnosis, unprovoked PE  Will not discuss prior treatment or any work up for cause but states "I don't have a clotting disorder"  Clotting in addition to recent wt loss does raise some suspicion for malignancy although possible that wt loss may be s/t paranoid/psych disorder  Continue heparin infusion for full anticoagulation  Vascular consult for ?intervention pending  Echo to r/o rt heart strain pending  Continue ICU hemodynamic monitoring  12/21  No RV strain on ECHOCARDIOGRAM    CONCLUSIONS     1 - Concentric remodeling.     2 - No wall motion abnormalities.     3 - Normal left ventricular systolic function (EF 60-65%).     4 - Normal left ventricular diastolic function.     5 - Normal right ventricular systolic function .   This document has been electronically    SIGNED BY: Bam Boyd MD On: 12/20/2018 17:08 strain on ECHO    "

## 2018-12-21 NOTE — PROGRESS NOTES
[facility] -   Pulmonology  Progress Note    Patient Name: Nimo Mahan  MRN: [id number]  Admission Date: 2018  Hospital Length of Stay: 1 days  Code Status: Full Code  Attending Provider: Brendan Jiménez, *  Primary Care Provider: Patrick Munoz MD   Principal Problem: Acute saddle pulmonary embolism without acute cor pulmonale    Subjective: no new complaints, still coughing     Past Medical History:   Diagnosis Date    Anxiety     Chronic mental illness     Depression     Pulmonary embolus        Past Surgical History:   Procedure Laterality Date     SECTION         Review of patient's allergies indicates:   Allergen Reactions    Sulfamethoxazole-trimethoprim      Patient says she is Immune to it       Family History     Problem Relation (Age of Onset)    Hyperlipidemia Mother, Father    Kidney disease Father        Tobacco Use    Smoking status: Current Every Day Smoker     Packs/day: 0.50     Types: Cigarettes    Smokeless tobacco: Never Used   Substance and Sexual Activity    Alcohol use: No    Drug use: Yes     Types: Marijuana    Sexual activity: Yes     Partners: Male         Review of Systems   Reason unable to perform ROS: limited responses.   Respiratory: Positive for cough and shortness of breath.         Denies sputum production today   Cardiovascular: Negative for chest pain and leg swelling.   Psychiatric/Behavioral: Positive for sleep disturbance. Negative for agitation and behavioral problems. The patient is nervous/anxious.      Objective:     Vital Signs (Most Recent):  Temp: 98.9 °F (37.2 °C) (18 0639)  Pulse: (!) 129 (18 1555)  Resp: 18 (18 1555)  BP: 127/60 (18 1555)  SpO2: 95 % (18 1555) Vital Signs (24h Range):  Temp:  [98.9 °F (37.2 °C)-103.1 °F (39.5 °C)] 98.9 °F (37.2 °C)  Pulse:  [] 129  Resp:  [18-34] 18  SpO2:  [93 %-100 %] 95 %  BP: ()/(41-77) 127/60     Weight: 70.1 kg (154 lb 8.7 oz)  Body  mass index is 26.53 kg/m².      Intake/Output Summary (Last 24 hours) at 12/21/2018 1755  Last data filed at 12/20/2018 2000  Gross per 24 hour   Intake 1358 ml   Output --   Net 1358 ml       Physical Exam   Constitutional: She is oriented to person, place, and time. No distress.   Thin female resting in bed with NAD   HENT:   Head: Normocephalic and atraumatic.   Eyes: Conjunctivae are normal. Pupils are equal, round, and reactive to light.   Neck: No tracheal deviation present.   Cardiovascular: Normal rate and regular rhythm.   Pulmonary/Chest: Effort normal and breath sounds normal. No respiratory distress.   Abdominal: Soft. Bowel sounds are normal. She exhibits no distension. There is no tenderness.   Neurological: She is alert and oriented to person, place, and time.   Skin: Skin is warm and dry. Capillary refill takes less than 2 seconds.   Psychiatric: Her speech is normal. Her affect is blunt. She is withdrawn.   Limited psych assessment, blunt affect evasive to questions re past or present health       Vents:  Oxygen Concentration (%): 21 (12/21/18 1027)    Lines/Drains/Airways     Peripheral Intravenous Line                 Peripheral IV - Single Lumen 12/19/18 2336 Right Antecubital 1 day         Peripheral IV - Single Lumen 12/20/18 0153 Left Upper Arm 1 day                Significant Labs:    CBC/Anemia Profile:  Recent Labs   Lab 12/20/18  0434 12/20/18  0809 12/21/18  0433   WBC 19.14*  17.60* 18.09* 15.66*   HGB 11.2*  11.2* 11.3* 10.1*   HCT 33.0*  32.7* 32.9* 29.7*     174 187 198   MCV 86  85 84 84   RDW 13.0  13.0 13.0 12.8        Chemistries:  Recent Labs   Lab 12/19/18  2336 12/20/18  0809 12/20/18  1048 12/21/18  0433    138 137 138   K 3.6 3.8 3.5 3.1*   CL 99 105 106 104   CO2 23 21* 22* 22*   BUN 12 9 9 6   CREATININE 1.0 0.7 0.7 0.6   CALCIUM 9.9 8.6* 8.4* 8.5*   ALBUMIN 3.8 2.9*  --  2.5*   PROT 8.6*  --   --   --    BILITOT 2.0*  --   --   --    ALKPHOS 69  --   --   " --    ALT 16  --   --   --    AST 18  --   --   --    MG  --  1.5*  --  1.4*   PHOS  --  1.8*  1.8*  --  1.6*  1.6*       All pertinent labs within the past 24 hours have been reviewed.    Significant Imaging:   I have reviewed all pertinent imaging results/findings within the past 24 hours.  I have reviewed and interpreted all pertinent imaging results/findings within the past 24 hours.      ABG  No results for input(s): PH, PO2, PCO2, HCO3, BE in the last 168 hours.  Assessment/Plan:     * Acute saddle pulmonary embolism without acute cor pulmonale    2nd diagnosis, unprovoked PE  Will not discuss prior treatment or any work up for cause but states "I don't have a clotting disorder"  Clotting in addition to recent wt loss does raise some suspicion for malignancy although possible that wt loss may be s/t paranoid/psych disorder  Continue heparin infusion for full anticoagulation  Vascular consult for ?intervention pending  Echo to r/o rt heart strain pending  Continue ICU hemodynamic monitoring  12/21  No RV strain on ECHOCARDIOGRAM    CONCLUSIONS     1 - Concentric remodeling.     2 - No wall motion abnormalities.     3 - Normal left ventricular systolic function (EF 60-65%).     4 - Normal left ventricular diastolic function.     5 - Normal right ventricular systolic function .   This document has been electronically    SIGNED BY: Bam Boyd MD On: 12/20/2018 17:08 strain on ECHO          Will need lifelong anticoagulation  Home oxygen evaluation in am  Discharge planning       Cornelio Villagomez MD  Pulmonology  [facility]    "

## 2018-12-21 NOTE — ASSESSMENT & PLAN NOTE
She is resting comfortably at this time 98% RA with milld tachycardia and normal BP at rest.  I reviewed the CTA and she has a right sided upper and lower bronchi PE but not main stem nor saddle embolus.  On the left she has small defect in a branch of the inferior bronchus . At this time, we do not believe she need any percutaneous intervention and recommend life long anticoagulation since this is second occurrence. May want hematology consult.  However if she decompensates then she will need EKOS TPA in which she does understand.

## 2018-12-21 NOTE — SUBJECTIVE & OBJECTIVE
Interval History: Pt denies any complaints. She is asking for discharge. Informed of recurrent fevers.     Review of Systems   Reason unable to perform ROS: limited responses.   Respiratory: Positive for cough. Negative for shortness of breath.         Denies sputum production today   Cardiovascular: Negative for chest pain and leg swelling.     Objective:     Vital Signs (Most Recent):  Temp: 98.9 °F (37.2 °C) (12/21/18 0639)  Pulse: 100 (12/21/18 1359)  Resp: 18 (12/21/18 1216)  BP: (!) 110/59 (12/21/18 1216)  SpO2: 96 % (12/21/18 1216) Vital Signs (24h Range):  Temp:  [98.9 °F (37.2 °C)-103.1 °F (39.5 °C)] 98.9 °F (37.2 °C)  Pulse:  [] 100  Resp:  [18-35] 18  SpO2:  [93 %-100 %] 96 %  BP: ()/(35-77) 110/59     Weight: 70.1 kg (154 lb 8.7 oz)  Body mass index is 26.53 kg/m².    Intake/Output Summary (Last 24 hours) at 12/21/2018 1549  Last data filed at 12/20/2018 2000  Gross per 24 hour   Intake 1458 ml   Output 400 ml   Net 1058 ml      Physical Exam   Constitutional: She is oriented to person, place, and time. She appears well-developed and well-nourished. No distress.   Thin female resting in bed with NAD   HENT:   Head: Normocephalic and atraumatic.   Eyes: Conjunctivae are normal. Pupils are equal, round, and reactive to light.   Neck: Neck supple. No tracheal deviation present.   Cardiovascular: Normal rate and regular rhythm.   Pulmonary/Chest: Effort normal and breath sounds normal. No respiratory distress.   Abdominal: Soft. Bowel sounds are normal. She exhibits no distension. There is no tenderness.   Musculoskeletal: Normal range of motion.   Neurological: She is alert and oriented to person, place, and time.   Skin: Skin is warm and dry. Capillary refill takes less than 2 seconds.   Psychiatric: Her speech is normal. Her affect is blunt. She is withdrawn.   Limited psych assessment, blunt affect evasive to questions re past or present health   Vitals reviewed.      Significant Labs:   CBC:    Recent Labs   Lab 12/20/18  0434 12/20/18  0809 12/21/18  0433   WBC 19.14*  17.60* 18.09* 15.66*   HGB 11.2*  11.2* 11.3* 10.1*   HCT 33.0*  32.7* 32.9* 29.7*     174 187 198     CMP:   Recent Labs   Lab 12/19/18  2336 12/20/18  0809 12/20/18  1048 12/21/18  0433    138 137 138   K 3.6 3.8 3.5 3.1*   CL 99 105 106 104   CO2 23 21* 22* 22*   * 104 100 82   BUN 12 9 9 6   CREATININE 1.0 0.7 0.7 0.6   CALCIUM 9.9 8.6* 8.4* 8.5*   PROT 8.6*  --   --   --    ALBUMIN 3.8 2.9*  --  2.5*   BILITOT 2.0*  --   --   --    ALKPHOS 69  --   --   --    AST 18  --   --   --    ALT 16  --   --   --    ANIONGAP 14 12 9 12   EGFRNONAA >60.0 >60 >60 >60     All pertinent labs within the past 24 hours have been reviewed.    Significant Imaging: I have reviewed all pertinent imaging results/findings within the past 24 hours.

## 2018-12-21 NOTE — SUBJECTIVE & OBJECTIVE
Past Medical History:   Diagnosis Date    Anxiety     Chronic mental illness     Depression     Pulmonary embolus        Past Surgical History:   Procedure Laterality Date     SECTION         Review of patient's allergies indicates:   Allergen Reactions    Sulfamethoxazole-trimethoprim      Patient says she is Immune to it       Family History     Problem Relation (Age of Onset)    Hyperlipidemia Mother, Father    Kidney disease Father        Tobacco Use    Smoking status: Current Every Day Smoker     Packs/day: 0.50     Types: Cigarettes    Smokeless tobacco: Never Used   Substance and Sexual Activity    Alcohol use: No    Drug use: Yes     Types: Marijuana    Sexual activity: Yes     Partners: Male         Review of Systems   Reason unable to perform ROS: limited responses.   Respiratory: Positive for cough and shortness of breath.         Denies sputum production today   Cardiovascular: Negative for chest pain and leg swelling.   Psychiatric/Behavioral: Positive for sleep disturbance. Negative for agitation and behavioral problems. The patient is nervous/anxious.      Objective:     Vital Signs (Most Recent):  Temp: 98.9 °F (37.2 °C) (18 0639)  Pulse: (!) 129 (18 1555)  Resp: 18 (18 1555)  BP: 127/60 (18 1555)  SpO2: 95 % (18 1555) Vital Signs (24h Range):  Temp:  [98.9 °F (37.2 °C)-103.1 °F (39.5 °C)] 98.9 °F (37.2 °C)  Pulse:  [] 129  Resp:  [18-34] 18  SpO2:  [93 %-100 %] 95 %  BP: ()/(41-77) 127/60     Weight: 70.1 kg (154 lb 8.7 oz)  Body mass index is 26.53 kg/m².      Intake/Output Summary (Last 24 hours) at 2018 3497  Last data filed at 2018  Gross per 24 hour   Intake 1358 ml   Output --   Net 1358 ml       Physical Exam   Constitutional: She is oriented to person, place, and time. No distress.   Thin female resting in bed with NAD   HENT:   Head: Normocephalic and atraumatic.   Eyes: Conjunctivae are normal. Pupils are equal,  round, and reactive to light.   Neck: No tracheal deviation present.   Cardiovascular: Normal rate and regular rhythm.   Pulmonary/Chest: Effort normal and breath sounds normal. No respiratory distress.   Abdominal: Soft. Bowel sounds are normal. She exhibits no distension. There is no tenderness.   Neurological: She is alert and oriented to person, place, and time.   Skin: Skin is warm and dry. Capillary refill takes less than 2 seconds.   Psychiatric: Her speech is normal. Her affect is blunt. She is withdrawn.   Limited psych assessment, blunt affect evasive to questions re past or present health       Vents:  Oxygen Concentration (%): 21 (12/21/18 1027)    Lines/Drains/Airways     Peripheral Intravenous Line                 Peripheral IV - Single Lumen 12/19/18 2336 Right Antecubital 1 day         Peripheral IV - Single Lumen 12/20/18 0153 Left Upper Arm 1 day                Significant Labs:    CBC/Anemia Profile:  Recent Labs   Lab 12/20/18  0434 12/20/18  0809 12/21/18  0433   WBC 19.14*  17.60* 18.09* 15.66*   HGB 11.2*  11.2* 11.3* 10.1*   HCT 33.0*  32.7* 32.9* 29.7*     174 187 198   MCV 86  85 84 84   RDW 13.0  13.0 13.0 12.8        Chemistries:  Recent Labs   Lab 12/19/18  2336 12/20/18  0809 12/20/18  1048 12/21/18  0433    138 137 138   K 3.6 3.8 3.5 3.1*   CL 99 105 106 104   CO2 23 21* 22* 22*   BUN 12 9 9 6   CREATININE 1.0 0.7 0.7 0.6   CALCIUM 9.9 8.6* 8.4* 8.5*   ALBUMIN 3.8 2.9*  --  2.5*   PROT 8.6*  --   --   --    BILITOT 2.0*  --   --   --    ALKPHOS 69  --   --   --    ALT 16  --   --   --    AST 18  --   --   --    MG  --  1.5*  --  1.4*   PHOS  --  1.8*  1.8*  --  1.6*  1.6*       All pertinent labs within the past 24 hours have been reviewed.    Significant Imaging:   I have reviewed all pertinent imaging results/findings within the past 24 hours.  I have reviewed and interpreted all pertinent imaging results/findings within the past 24 hours.

## 2018-12-21 NOTE — NURSING
Patient transferred to Tele via wheelchair. Bedside report given to Shala. Tele monitor in place. Heparin gtt. Cont fluids. IV antibiotics. VSS. No complaints of pain. Fever breaking. Oriented to room. Family at bedside. Will cont to monitor

## 2018-12-21 NOTE — ASSESSMENT & PLAN NOTE
Empiric Zosyn  WBC trending down  U/A pending, CTA of Chest negative  Repeat blood cultures pending

## 2018-12-21 NOTE — PROGRESS NOTES
[facility] Hospital Medicine  Progress Note    Patient Name: Nimo Mahan  MRN: [id number]  Patient Class: IP- Inpatient   Admission Date: 12/19/2018  Length of Stay: 1 days  Attending Physician: Brendan Jiménez, *  Primary Care Provider: Patrick Munoz MD        Subjective:     Principal Problem:Acute saddle pulmonary embolism without acute cor pulmonale    HPI:  28F h/o PE presents with hemoptysis that started 3 days ago.  Associated with SOB and right sided chest wall pain.   Denies fevers, chills, n/v, ab pain, dysuria.  Denies other symptoms.  Reports h/o of PE more than 2 years ago.   In ER, CTA chest preliminary read show acute pulmonary artery with near occlusive thrombus in the right interlobar, lower lobe segmental and upper lobe segmental pulmonary arteries with saddle embolus extending into the distal right main artery, nonocclusive thrombus in left lower lobe pulmonary artery extending into the segmental branches, decreased right lung volume and areas of peripheral consolidation in right lobe consistent with pulmonary infarcts.   CVT surgery consulted in ER and report no need for emergency surgery now since patient his hemodynamically stable.   Recommend anticoagulation and will see in AM.  Hospital medicine called for admission.       Hospital Course:  28 y/p aaf admitted with a dx of Saddle PE hemodynamically stable . Pt was admitted to ICU and started on heparin  Drip / CVT was consulted and recommend  Medical treatment for now . Pt now with 102 fF most likely due to PE , however will start prophylactic IVAB and f/u blood cx. Initial blood cultures was NGTD. Repeat blood cultures pending and WBC trending down. Pt spiked temp 103 overnight. CTA of Chest was negative, U/A pending. Continue empiric Zosyn.       Interval History: Pt denies any complaints. She is asking for discharge. Informed of recurrent fevers.     Review of Systems   Reason unable to perform ROS: limited  responses.   Respiratory: Positive for cough. Negative for shortness of breath.         Denies sputum production today   Cardiovascular: Negative for chest pain and leg swelling.     Objective:     Vital Signs (Most Recent):  Temp: 98.9 °F (37.2 °C) (12/21/18 0639)  Pulse: 100 (12/21/18 1359)  Resp: 18 (12/21/18 1216)  BP: (!) 110/59 (12/21/18 1216)  SpO2: 96 % (12/21/18 1216) Vital Signs (24h Range):  Temp:  [98.9 °F (37.2 °C)-103.1 °F (39.5 °C)] 98.9 °F (37.2 °C)  Pulse:  [] 100  Resp:  [18-35] 18  SpO2:  [93 %-100 %] 96 %  BP: ()/(35-77) 110/59     Weight: 70.1 kg (154 lb 8.7 oz)  Body mass index is 26.53 kg/m².    Intake/Output Summary (Last 24 hours) at 12/21/2018 1549  Last data filed at 12/20/2018 2000  Gross per 24 hour   Intake 1458 ml   Output 400 ml   Net 1058 ml      Physical Exam   Constitutional: She is oriented to person, place, and time. She appears well-developed and well-nourished. No distress.   Thin female resting in bed with NAD   HENT:   Head: Normocephalic and atraumatic.   Eyes: Conjunctivae are normal. Pupils are equal, round, and reactive to light.   Neck: Neck supple. No tracheal deviation present.   Cardiovascular: Normal rate and regular rhythm.   Pulmonary/Chest: Effort normal and breath sounds normal. No respiratory distress.   Abdominal: Soft. Bowel sounds are normal. She exhibits no distension. There is no tenderness.   Musculoskeletal: Normal range of motion.   Neurological: She is alert and oriented to person, place, and time.   Skin: Skin is warm and dry. Capillary refill takes less than 2 seconds.   Psychiatric: Her speech is normal. Her affect is blunt. She is withdrawn.   Limited psych assessment, blunt affect evasive to questions re past or present health   Vitals reviewed.      Significant Labs:   CBC:   Recent Labs   Lab 12/20/18  0434 12/20/18  0809 12/21/18  0433   WBC 19.14*  17.60* 18.09* 15.66*   HGB 11.2*  11.2* 11.3* 10.1*   HCT 33.0*  32.7* 32.9* 29.7*      174 187 198     CMP:   Recent Labs   Lab 12/19/18  2336 12/20/18  0809 12/20/18  1048 12/21/18  0433    138 137 138   K 3.6 3.8 3.5 3.1*   CL 99 105 106 104   CO2 23 21* 22* 22*   * 104 100 82   BUN 12 9 9 6   CREATININE 1.0 0.7 0.7 0.6   CALCIUM 9.9 8.6* 8.4* 8.5*   PROT 8.6*  --   --   --    ALBUMIN 3.8 2.9*  --  2.5*   BILITOT 2.0*  --   --   --    ALKPHOS 69  --   --   --    AST 18  --   --   --    ALT 16  --   --   --    ANIONGAP 14 12 9 12   EGFRNONAA >60.0 >60 >60 >60     All pertinent labs within the past 24 hours have been reviewed.    Significant Imaging: I have reviewed all pertinent imaging results/findings within the past 24 hours.    Assessment/Plan:      * Acute saddle pulmonary embolism without acute cor pulmonale    - IV heparin bolus/gtt per PE protocol  - continuous oxygen  - Order TTE in AM to evaluate right heart strain - negative for right heart strain  - tessalon pearls prn cough  Pt needs to be discharged on Coumadin with Lovenox bridge       Leukocytosis    Empiric Zosyn  WBC trending down  U/A pending, CTA of Chest negative  Repeat blood cultures pending       Pleuritic pain           Severe malnutrition             VTE Risk Mitigation (From admission, onward)        Ordered     heparin 25,000 units in dextrose 5% 250 mL (100 units/mL) infusion HIGH INTENSITY nomogram - [facility]  Continuous      12/20/18 0406     heparin 25,000 units in dextrose 5% (100 units/ml) IV bolus from bag - ADDITIONAL PRN BOLUS - 30 units/kg  As needed (PRN)      12/20/18 0406     heparin 25,000 units in dextrose 5% (100 units/ml) IV bolus from bag - ADDITIONAL PRN BOLUS - 60 units/kg  As needed (PRN)      12/20/18 0406     Place FERNANDO hose  Until discontinued      12/20/18 0409     Place sequential compression device  Until discontinued      12/20/18 0409     Reason for No Pharmacological VTE Prophylaxis  Once      12/20/18 0409     IP VTE HIGH RISK PATIENT  Once      12/20/18 0409               Michelle Allen NP  Department of Hospital Medicine   [facility]

## 2018-12-21 NOTE — SUBJECTIVE & OBJECTIVE
Medications Prior to Admission   Medication Sig Dispense Refill Last Dose    ALPRAZolam (XANAX) 0.5 MG tablet Take 0.5 mg by mouth 2 (two) times daily as needed.    Past Week at Unknown time    escitalopram oxalate (LEXAPRO) 20 MG tablet Take 20 mg by mouth once daily.   More than a month at Unknown time    haloperidol (HALDOL) 10 MG tablet Take 10 mg by mouth every evening.    More than a month at Unknown time    risperiDONE (RISPERDAL) 2 MG tablet Take 2 mg by mouth 2 (two) times daily.   More than a month at Unknown time    sertraline (ZOLOFT) 100 MG tablet Take 100 mg by mouth once daily.   More than a month at Unknown time       Review of patient's allergies indicates:   Allergen Reactions    Sulfamethoxazole-trimethoprim      Patient says she is Immune to it       Past Medical History:   Diagnosis Date    Anxiety     Chronic mental illness     Depression     Pulmonary embolus      Past Surgical History:   Procedure Laterality Date     SECTION       Family History     Problem Relation (Age of Onset)    Hyperlipidemia Mother, Father    Kidney disease Father        Tobacco Use    Smoking status: Current Every Day Smoker     Packs/day: 0.50     Types: Cigarettes    Smokeless tobacco: Never Used   Substance and Sexual Activity    Alcohol use: No    Drug use: Yes     Types: Marijuana    Sexual activity: Yes     Partners: Male     Review of Systems   Constitutional: Negative.    HENT: Negative.    Eyes: Negative.    Respiratory: Positive for shortness of breath.    Cardiovascular: Positive for chest pain.   Gastrointestinal: Negative.    Endocrine: Negative.    Genitourinary: Negative.    Musculoskeletal: Negative.    Skin: Negative.    Allergic/Immunologic: Negative.    Neurological: Negative.    Hematological: Negative.    Psychiatric/Behavioral: Positive for agitation.   All other systems reviewed and are negative.    Objective:     Vital Signs (Most Recent):  Temp: (!) 101.9 °F (38.8 °C)  (12/20/18 1600)  Pulse: 94 (12/20/18 1700)  Resp: (!) 26 (12/20/18 1700)  BP: (!) 107/54 (12/20/18 1700)  SpO2: 95 % (12/20/18 1700) Vital Signs (24h Range):  Temp:  [98.1 °F (36.7 °C)-102.7 °F (39.3 °C)] 101.9 °F (38.8 °C)  Pulse:  [] 94  Resp:  [22-46] 26  SpO2:  [95 %-100 %] 95 %  BP: ()/(35-67) 107/54     Weight: 68 kg (149 lb 14.6 oz)  Body mass index is 25.73 kg/m².    Physical Exam   Constitutional: She appears well-developed.   Eyes: Pupils are equal, round, and reactive to light.   Neck: Normal range of motion.   Cardiovascular:      Pulmonary/Chest: Effort normal and breath sounds normal.   Abdominal: Soft. Bowel sounds are normal.   Musculoskeletal: Normal range of motion.   Neurological: She is alert.   Skin: Skin is warm. Capillary refill takes less than 2 seconds.   Psychiatric: She has a normal mood and affect.   Nursing note and vitals reviewed.      Significant Labs:  All pertinent labs from the last 24 hours have been reviewed.    Significant Diagnostics:  I have reviewed and interpreted all pertinent imaging results/findings within the past 24 hours.

## 2018-12-21 NOTE — CONSULTS
[facility]   Vascular Surgery  Consult Note    Consults pumarol  Subjective:     Chief Complaint/Reason for Admission: SOB    History of Present Illness: 28F h/o PE presents with hemoptysis that started 3 days ago.  Associated with SOB and right sided chest wall pain.   Denies fevers, chills, n/v, ab pain, dysuria.  Denies other symptoms.  Reports h/o of PE more than 2 years ago.   In ER, CTA chest preliminary read show acute pulmonary artery with near occlusive thrombus in the right interlobar, lower lobe segmental and upper lobe segmental pulmonary arteries with saddle embolus extending into the distal right main artery, nonocclusive thrombus in left lower lobe pulmonary artery extending into the segmental branches, decreased right lung volume and areas of peripheral consolidation in right lobe consistent with pulmonary infarcts.   CVT surgery consulted in ER and report no need for emergency surgery now since patient his hemodynamically stable.   Recommend anticoagulation and will see in AM.  Hospital medicine called for admission.     We were called to evaluate PE and currently in ICU and stable.  Pt. States she feels fine at rest but does have SOB with ambulation.  This is her second episode in which first was in 2015 after giving birth which she states she did 6 month course of coumadin.      Medications Prior to Admission   Medication Sig Dispense Refill Last Dose    ALPRAZolam (XANAX) 0.5 MG tablet Take 0.5 mg by mouth 2 (two) times daily as needed.    Past Week at Unknown time    escitalopram oxalate (LEXAPRO) 20 MG tablet Take 20 mg by mouth once daily.   More than a month at Unknown time    haloperidol (HALDOL) 10 MG tablet Take 10 mg by mouth every evening.    More than a month at Unknown time    risperiDONE (RISPERDAL) 2 MG tablet Take 2 mg by mouth 2 (two) times daily.   More than a month at Unknown time    sertraline (ZOLOFT) 100 MG tablet Take 100 mg by mouth once daily.   More  than a month at Unknown time       Review of patient's allergies indicates:   Allergen Reactions    Sulfamethoxazole-trimethoprim      Patient says she is Immune to it       Past Medical History:   Diagnosis Date    Anxiety     Chronic mental illness     Depression     Pulmonary embolus      Past Surgical History:   Procedure Laterality Date     SECTION       Family History     Problem Relation (Age of Onset)    Hyperlipidemia Mother, Father    Kidney disease Father        Tobacco Use    Smoking status: Current Every Day Smoker     Packs/day: 0.50     Types: Cigarettes    Smokeless tobacco: Never Used   Substance and Sexual Activity    Alcohol use: No    Drug use: Yes     Types: Marijuana    Sexual activity: Yes     Partners: Male     Review of Systems   Constitutional: Negative.    HENT: Negative.    Eyes: Negative.    Respiratory: Positive for shortness of breath.    Cardiovascular: Positive for chest pain.   Gastrointestinal: Negative.    Endocrine: Negative.    Genitourinary: Negative.    Musculoskeletal: Negative.    Skin: Negative.    Allergic/Immunologic: Negative.    Neurological: Negative.    Hematological: Negative.    Psychiatric/Behavioral: Positive for agitation.   All other systems reviewed and are negative.    Objective:     Vital Signs (Most Recent):  Temp: (!) 101.9 °F (38.8 °C) (18 1600)  Pulse: 94 (18 1700)  Resp: (!) 26 (18 1700)  BP: (!) 107/54 (18 1700)  SpO2: 95 % (18 1700) Vital Signs (24h Range):  Temp:  [98.1 °F (36.7 °C)-102.7 °F (39.3 °C)] 101.9 °F (38.8 °C)  Pulse:  [] 94  Resp:  [22-46] 26  SpO2:  [95 %-100 %] 95 %  BP: ()/(35-67) 107/54     Weight: 68 kg (149 lb 14.6 oz)  Body mass index is 25.73 kg/m².    Physical Exam   Constitutional: She appears well-developed.   Eyes: Pupils are equal, round, and reactive to light.   Neck: Normal range of motion.   Cardiovascular:      Pulmonary/Chest: Effort normal and breath  sounds normal.   Abdominal: Soft. Bowel sounds are normal.   Musculoskeletal: Normal range of motion.   Neurological: She is alert.   Skin: Skin is warm. Capillary refill takes less than 2 seconds.   Psychiatric: She has a normal mood and affect.   Nursing note and vitals reviewed.      Significant Labs:  All pertinent labs from the last 24 hours have been reviewed.    Significant Diagnostics:  I have reviewed and interpreted all pertinent imaging results/findings within the past 24 hours.    Assessment/Plan:     * Acute saddle pulmonary embolism without acute cor pulmonale    She is resting comfortably at this time 98% RA with milld tachycardia and normal BP at rest.  I reviewed the CTA and she has a right sided upper and lower bronchi PE but not main stem nor saddle embolus.  On the left she has small defect in a branch of the inferior bronchus . At this time, we do not believe she need any percutaneous intervention and recommend life long anticoagulation since this is second occurrence. May want hematology consult.  However if she decompensates then she will need EKOS TPA in which she does understand.          Thank you for your consult. as above    Ryan Araya MD  Vascular Surgery  [facility]

## 2018-12-21 NOTE — NURSING
Pt transferred to unit from ICU via wheelchair by Melanie. Bedside report received. I agree with the assessment that has already been completed. No changes. Heparin gtt infusing w/o difficulty to Left AC. IVF & antibiotics infusing to Right AC w/o difficulty. Safety measures in use. Significant other at bedside. Will continue to monitor. Shala Cohen RN

## 2018-12-21 NOTE — PLAN OF CARE
Problem: Adult Inpatient Plan of Care  Goal: Plan of Care Review  Outcome: Ongoing (interventions implemented as appropriate)  Patient on room air tolerating well. No distress noted at this time.

## 2018-12-21 NOTE — PLAN OF CARE
Problem: Adult Inpatient Plan of Care  Goal: Plan of Care Review  Outcome: Ongoing (interventions implemented as appropriate)  Pt on room air tolerating well. No distress noted at this time.

## 2018-12-21 NOTE — HPI
28F h/o PE presents with hemoptysis that started 3 days ago.  Associated with SOB and right sided chest wall pain.   Denies fevers, chills, n/v, ab pain, dysuria.  Denies other symptoms.  Reports h/o of PE more than 2 years ago.   In ER, CTA chest preliminary read show acute pulmonary artery with near occlusive thrombus in the right interlobar, lower lobe segmental and upper lobe segmental pulmonary arteries with saddle embolus extending into the distal right main artery, nonocclusive thrombus in left lower lobe pulmonary artery extending into the segmental branches, decreased right lung volume and areas of peripheral consolidation in right lobe consistent with pulmonary infarcts.   CVT surgery consulted in ER and report no need for emergency surgery now since patient his hemodynamically stable.   Recommend anticoagulation and will see in AM.  Hospital medicine called for admission.     We were called to evaluate PE and currently in ICU and stable.  Pt. States she feels fine at rest but does have SOB with ambulation.  This is her second episode in which first was in 2015 after giving birth which she states she did 6 month course of coumadin.

## 2018-12-22 VITALS
OXYGEN SATURATION: 97 % | BODY MASS INDEX: 27.59 KG/M2 | SYSTOLIC BLOOD PRESSURE: 106 MMHG | TEMPERATURE: 99 F | RESPIRATION RATE: 20 BRPM | HEART RATE: 93 BPM | HEIGHT: 64 IN | WEIGHT: 161.63 LBS | DIASTOLIC BLOOD PRESSURE: 66 MMHG

## 2018-12-22 LAB
ALBUMIN SERPL BCP-MCNC: 2.5 G/DL
ANION GAP SERPL CALC-SCNC: 13 MMOL/L
APTT BLDCRRT: 40.8 SEC
BASOPHILS # BLD AUTO: 0.01 K/UL
BASOPHILS NFR BLD: 0.1 %
BUN SERPL-MCNC: 3 MG/DL
CALCIUM SERPL-MCNC: 9.1 MG/DL
CHLORIDE SERPL-SCNC: 103 MMOL/L
CO2 SERPL-SCNC: 25 MMOL/L
CREAT SERPL-MCNC: 0.7 MG/DL
DIFFERENTIAL METHOD: ABNORMAL
EOSINOPHIL # BLD AUTO: 0 K/UL
EOSINOPHIL NFR BLD: 0 %
ERYTHROCYTE [DISTWIDTH] IN BLOOD BY AUTOMATED COUNT: 12.8 %
EST. GFR  (AFRICAN AMERICAN): >60 ML/MIN/1.73 M^2
EST. GFR  (NON AFRICAN AMERICAN): >60 ML/MIN/1.73 M^2
GLUCOSE SERPL-MCNC: 101 MG/DL
HCT VFR BLD AUTO: 31.9 %
HGB BLD-MCNC: 10.9 G/DL
LYMPHOCYTES # BLD AUTO: 2.1 K/UL
LYMPHOCYTES NFR BLD: 13.5 %
MAGNESIUM SERPL-MCNC: 1.6 MG/DL
MCH RBC QN AUTO: 28.7 PG
MCHC RBC AUTO-ENTMCNC: 34.2 G/DL
MCV RBC AUTO: 84 FL
MONOCYTES # BLD AUTO: 1.2 K/UL
MONOCYTES NFR BLD: 7.5 %
NEUTROPHILS # BLD AUTO: 12.4 K/UL
NEUTROPHILS NFR BLD: 78.9 %
PHOSPHATE SERPL-MCNC: 3.4 MG/DL
PHOSPHATE SERPL-MCNC: 3.4 MG/DL
PLATELET # BLD AUTO: 252 K/UL
PMV BLD AUTO: 11.1 FL
POTASSIUM SERPL-SCNC: 3.2 MMOL/L
RBC # BLD AUTO: 3.8 M/UL
SODIUM SERPL-SCNC: 141 MMOL/L
WBC # BLD AUTO: 15.65 K/UL

## 2018-12-22 PROCEDURE — 63600175 PHARM REV CODE 636 W HCPCS: Performed by: HOSPITALIST

## 2018-12-22 PROCEDURE — 80069 RENAL FUNCTION PANEL: CPT

## 2018-12-22 PROCEDURE — 25000003 PHARM REV CODE 250: Performed by: NURSE PRACTITIONER

## 2018-12-22 PROCEDURE — 85730 THROMBOPLASTIN TIME PARTIAL: CPT

## 2018-12-22 PROCEDURE — 83735 ASSAY OF MAGNESIUM: CPT

## 2018-12-22 PROCEDURE — C9113 INJ PANTOPRAZOLE SODIUM, VIA: HCPCS | Performed by: HOSPITALIST

## 2018-12-22 PROCEDURE — 36415 COLL VENOUS BLD VENIPUNCTURE: CPT

## 2018-12-22 PROCEDURE — 25000003 PHARM REV CODE 250: Performed by: INTERNAL MEDICINE

## 2018-12-22 PROCEDURE — 63600175 PHARM REV CODE 636 W HCPCS: Performed by: INTERNAL MEDICINE

## 2018-12-22 PROCEDURE — 85025 COMPLETE CBC W/AUTO DIFF WBC: CPT

## 2018-12-22 RX ADMIN — MAGNESIUM OXIDE TAB 400 MG (241.3 MG ELEMENTAL MG) 400 MG: 400 (241.3 MG) TAB at 09:12

## 2018-12-22 RX ADMIN — PANTOPRAZOLE SODIUM 40 MG: 40 INJECTION, POWDER, FOR SOLUTION INTRAVENOUS at 09:12

## 2018-12-22 RX ADMIN — PIPERACILLIN SODIUM AND TAZOBACTAM SODIUM 4.5 G: 4; .5 INJECTION, POWDER, LYOPHILIZED, FOR SOLUTION INTRAVENOUS at 09:12

## 2018-12-22 RX ADMIN — HALOPERIDOL 2 MG: 1 TABLET ORAL at 09:12

## 2018-12-22 RX ADMIN — POTASSIUM, SODIUM PHOSPHATES 280 MG-160 MG-250 MG ORAL POWDER PACKET 2 PACKET: POWDER IN PACKET at 05:12

## 2018-12-22 RX ADMIN — PIPERACILLIN SODIUM AND TAZOBACTAM SODIUM 4.5 G: 4; .5 INJECTION, POWDER, LYOPHILIZED, FOR SOLUTION INTRAVENOUS at 01:12

## 2018-12-22 NOTE — PROGRESS NOTES
Patient wanted to leave AMA. Notified NP and MD. Patient informed on how serious her condition is, but she still wanted to leave.both iv access removed.

## 2018-12-22 NOTE — PLAN OF CARE
Problem: Adult Inpatient Plan of Care  Goal: Patient-Specific Goal (Individualization)  Outcome: Ongoing (interventions implemented as appropriate)  Pt in bed AAOx4. Plan of Care reviewed, Verbalized understanding.   Patient remained free from falls, fall precautions in place.  Patient is refusing to be placed on monitor.VSS.  PIV CDI.   Call bell and personal belongings within reach. Hourly rounding complete. Reminded to call for assistance. No complaints at this time. Will continue to monitor.

## 2018-12-22 NOTE — DISCHARGE SUMMARY
[facility] Hospital Medicine  Discharge Summary      Patient Name: Nimo Mahan  MRN: [id number]  Admission Date: 12/19/2018  Hospital Length of Stay: 2 days  Discharge Date and Time:  12/22/2018 4:05 PM  Attending Physician: No att. providers found   Discharging Provider: Mikey Padilla MD  Primary Care Provider: Patrick Munoz MD      HPI:   28F h/o PE presents with hemoptysis that started 3 days ago.  Associated with SOB and right sided chest wall pain.   Denies fevers, chills, n/v, ab pain, dysuria.  Denies other symptoms.  Reports h/o of PE more than 2 years ago.   In ER, CTA chest preliminary read show acute pulmonary artery with near occlusive thrombus in the right interlobar, lower lobe segmental and upper lobe segmental pulmonary arteries with saddle embolus extending into the distal right main artery, nonocclusive thrombus in left lower lobe pulmonary artery extending into the segmental branches, decreased right lung volume and areas of peripheral consolidation in right lobe consistent with pulmonary infarcts.   CVT surgery consulted in ER and report no need for emergency surgery now since patient his hemodynamically stable.   Recommend anticoagulation and will see in AM.  Hospital medicine called for admission.       * No surgery found *      Hospital Course:   28 y/p aaf admitted with a dx of Saddle PE hemodynamically stable . Pt was admitted to ICU and started on heparin  Drip / CVT was consulted and recommend  Medical treatment for now . Pt now with 102 fF most likely due to PE , however will start prophylactic IVAB and f/u blood cx. Initial blood cultures was NGTD. Repeat blood cultures pending and WBC trending down. Pt spiked temp 103 overnight. CTA of Chest was negative, U/A pending. Continue empiric Zosyn.     12/22/18  Called to bedside by nurse, due to patient wanting to leave AMA. Saw patient at bedside, and she expressed wanting to be discharged AMA. Patient was  AOx3, understood risk of leaving AMA, which are but not limited to progression of pulmonary embolus, cardio respiratory arrest, coma and death. Patient verbalized understanding and signed out AMA.      Consults:   Consults (From admission, onward)        Status Ordering Provider     Inpatient consult to Pulmonology  Once     Provider:  (Not yet assigned)    Completed RODRIGO CATALAN     Inpatient consult to Registered Dietitian/Nutritionist  Once     Provider:  (Not yet assigned)    Completed RODRIGO CATALAN     Inpatient consult to Registered Dietitian/Nutritionist  Once     Provider:  (Not yet assigned)    Completed PING EVERETT          * Acute saddle pulmonary embolism without acute cor pulmonale    - IV heparin bolus/gtt per PE protocol  - continuous oxygen  - Order TTE in AM to evaluate right heart strain - negative for right heart strain  - tessalon pearls prn cough  Pt needs to be discharged on Coumadin with Lovenox bridge    12/22/18  Left AMA        Leukocytosis    Empiric Zosyn  WBC trending down  U/A pending, CTA of Chest negative  Repeat blood cultures pending    12/22/18  Left AMA      Pleuritic pain    12/22/18  Left AMA        Severe malnutrition    12/22/18  Left AMA         Final Active Diagnoses:    Diagnosis Date Noted POA    PRINCIPAL PROBLEM:  Acute saddle pulmonary embolism without acute cor pulmonale [I26.92] 12/20/2018 Yes    Severe malnutrition [E43] 12/20/2018 Yes    Pleuritic pain [R07.81] 12/20/2018 Yes    Leukocytosis [D72.829] 12/20/2018 Yes      Problems Resolved During this Admission:       Discharged Condition: critical    Disposition: Left Against Medical Adv*    Follow Up:    Patient Instructions:   No discharge procedures on file.    Significant Diagnostic Studies: Labs:   CMP   Recent Labs   Lab 12/21/18  0433 12/22/18  0449    141   K 3.1* 3.2*    103   CO2 22* 25   GLU 82 101   BUN 6 3*   CREATININE 0.6 0.7   CALCIUM 8.5* 9.1   ALBUMIN 2.5* 2.5*    ANIONGAP 12 13   ESTGFRAFRICA >60 >60   EGFRNONAA >60 >60   , CBC   Recent Labs   Lab 18  0433 18  0450   WBC 15.66* 15.65*   HGB 10.1* 10.9*   HCT 29.7* 31.9*    252    and INR   Lab Results   Component Value Date    INR 1.2 2018     Radiology:     Pending Diagnostic Studies:     None         Medications:  None (patient  at medical facility)    Indwelling Lines/Drains at time of discharge:   Lines/Drains/Airways          None          Time spent on the discharge of patient: greater than 30 minutes  Patient was seen and examined on the date of discharge and determined to be suitable for discharge.         Mikey Padilla MD  Department of Hospital Medicine  [facility]

## 2018-12-22 NOTE — ASSESSMENT & PLAN NOTE
Empiric Zosyn  WBC trending down  U/A pending, CTA of Chest negative  Repeat blood cultures pending    12/22/18  Left AMA

## 2018-12-22 NOTE — ASSESSMENT & PLAN NOTE
- IV heparin bolus/gtt per PE protocol  - continuous oxygen  - Order TTE in AM to evaluate right heart strain - negative for right heart strain  - tessalon pearls prn cough  Pt needs to be discharged on Coumadin with Lovenox bridge    12/22/18  Left AMA

## 2018-12-25 LAB
BACTERIA BLD CULT: NORMAL
BACTERIA BLD CULT: NORMAL

## 2018-12-26 NOTE — PLAN OF CARE
12/26/18 1441   Final Note   Assessment Type Final Discharge Note   Anticipated Discharge Disposition Home   Right Care Referral Info   Post Acute Recommendation No Care

## 2019-09-23 ENCOUNTER — HOSPITAL ENCOUNTER (EMERGENCY)
Facility: HOSPITAL | Age: 29
Discharge: HOME | End: 2019-09-23
Attending: EMERGENCY MEDICINE

## 2019-09-23 VITALS
SYSTOLIC BLOOD PRESSURE: 135 MMHG | RESPIRATION RATE: 18 BRPM | OXYGEN SATURATION: 99 % | HEART RATE: 85 BPM | WEIGHT: 174.81 LBS | BODY MASS INDEX: 30.01 KG/M2 | TEMPERATURE: 99 F | DIASTOLIC BLOOD PRESSURE: 89 MMHG

## 2019-09-23 DIAGNOSIS — Z86.59 HISTORY OF BIPOLAR DISORDER: ICD-10-CM

## 2019-09-23 DIAGNOSIS — Z72.0 TOBACCO ABUSE: Primary | ICD-10-CM

## 2019-09-23 LAB
ALBUMIN SERPL BCP-MCNC: 4.2 G/DL (ref 3.5–5.2)
ALP SERPL-CCNC: 73 U/L (ref 55–135)
ALT SERPL W/O P-5'-P-CCNC: 18 U/L (ref 10–44)
AMPHET+METHAMPHET UR QL: NEGATIVE
ANION GAP SERPL CALC-SCNC: 11 MMOL/L (ref 8–16)
APAP SERPL-MCNC: <3 UG/ML (ref 10–20)
AST SERPL-CCNC: 19 U/L (ref 10–40)
B-HCG UR QL: NEGATIVE
BARBITURATES UR QL SCN>200 NG/ML: NEGATIVE
BASOPHILS # BLD AUTO: 0.02 K/UL (ref 0–0.2)
BASOPHILS NFR BLD: 0.3 % (ref 0–1.9)
BENZODIAZ UR QL SCN>200 NG/ML: NEGATIVE
BILIRUB SERPL-MCNC: 0.7 MG/DL (ref 0.1–1)
BILIRUB UR QL STRIP: ABNORMAL
BUN SERPL-MCNC: 11 MG/DL (ref 6–20)
BZE UR QL SCN: NEGATIVE
CALCIUM SERPL-MCNC: 9.3 MG/DL (ref 8.7–10.5)
CANNABINOIDS UR QL SCN: NORMAL
CHLORIDE SERPL-SCNC: 103 MMOL/L (ref 95–110)
CLARITY UR REFRACT.AUTO: ABNORMAL
CO2 SERPL-SCNC: 28 MMOL/L (ref 23–29)
COLOR UR AUTO: YELLOW
CREAT SERPL-MCNC: 0.8 MG/DL (ref 0.5–1.4)
CREAT UR-MCNC: 284.8 MG/DL (ref 15–325)
DIFFERENTIAL METHOD: ABNORMAL
EOSINOPHIL # BLD AUTO: 0 K/UL (ref 0–0.5)
EOSINOPHIL NFR BLD: 0.4 % (ref 0–8)
ERYTHROCYTE [DISTWIDTH] IN BLOOD BY AUTOMATED COUNT: 12.7 % (ref 11.5–14.5)
EST. GFR  (AFRICAN AMERICAN): >60 ML/MIN/1.73 M^2
EST. GFR  (NON AFRICAN AMERICAN): >60 ML/MIN/1.73 M^2
ETHANOL SERPL-MCNC: <10 MG/DL
GLUCOSE SERPL-MCNC: 103 MG/DL (ref 70–110)
GLUCOSE UR QL STRIP: NEGATIVE
HCT VFR BLD AUTO: 39.4 % (ref 37–48.5)
HGB BLD-MCNC: 13.3 G/DL (ref 12–16)
HGB UR QL STRIP: ABNORMAL
KETONES UR QL STRIP: ABNORMAL
LEUKOCYTE ESTERASE UR QL STRIP: NEGATIVE
LYMPHOCYTES # BLD AUTO: 3.1 K/UL (ref 1–4.8)
LYMPHOCYTES NFR BLD: 40.8 % (ref 18–48)
MCH RBC QN AUTO: 27.9 PG (ref 27–31)
MCHC RBC AUTO-ENTMCNC: 33.8 G/DL (ref 32–36)
MCV RBC AUTO: 83 FL (ref 82–98)
METHADONE UR QL SCN>300 NG/ML: NEGATIVE
MONOCYTES # BLD AUTO: 0.5 K/UL (ref 0.3–1)
MONOCYTES NFR BLD: 6 % (ref 4–15)
NEUTROPHILS # BLD AUTO: 3.9 K/UL (ref 1.8–7.7)
NEUTROPHILS NFR BLD: 52.5 % (ref 38–73)
NITRITE UR QL STRIP: NEGATIVE
OPIATES UR QL SCN: NEGATIVE
PCP UR QL SCN>25 NG/ML: NEGATIVE
PH UR STRIP: 6 [PH] (ref 5–8)
PLATELET # BLD AUTO: 377 K/UL (ref 150–350)
PMV BLD AUTO: 10.1 FL (ref 9.2–12.9)
POTASSIUM SERPL-SCNC: 3.1 MMOL/L (ref 3.5–5.1)
PROT SERPL-MCNC: 7.8 G/DL (ref 6–8.4)
PROT UR QL STRIP: NEGATIVE
RBC # BLD AUTO: 4.77 M/UL (ref 4–5.4)
SALICYLATES SERPL-MCNC: <5 MG/DL (ref 15–30)
SODIUM SERPL-SCNC: 142 MMOL/L (ref 136–145)
SP GR UR STRIP: 1.02 (ref 1–1.03)
TOXICOLOGY INFORMATION: NORMAL
TSH SERPL DL<=0.005 MIU/L-ACNC: 0.5 UIU/ML (ref 0.4–4)
URN SPEC COLLECT METH UR: ABNORMAL
UROBILINOGEN UR STRIP-ACNC: <2 EU/DL
WBC # BLD AUTO: 7.53 K/UL (ref 3.9–12.7)

## 2019-09-23 PROCEDURE — 81003 URINALYSIS AUTO W/O SCOPE: CPT | Mod: 59,ER

## 2019-09-23 PROCEDURE — 81025 URINE PREGNANCY TEST: CPT | Mod: ER

## 2019-09-23 PROCEDURE — 84443 ASSAY THYROID STIM HORMONE: CPT | Mod: ER

## 2019-09-23 PROCEDURE — G0425 PR INPT TELEHEALTH CONSULT 30M: ICD-10-PCS | Mod: GT,,, | Performed by: PSYCHIATRY & NEUROLOGY

## 2019-09-23 PROCEDURE — 80320 DRUG SCREEN QUANTALCOHOLS: CPT | Mod: ER

## 2019-09-23 PROCEDURE — 80329 ANALGESICS NON-OPIOID 1 OR 2: CPT | Mod: ER

## 2019-09-23 PROCEDURE — 80307 DRUG TEST PRSMV CHEM ANLYZR: CPT | Mod: ER

## 2019-09-23 PROCEDURE — 99285 EMERGENCY DEPT VISIT HI MDM: CPT | Mod: ER

## 2019-09-23 PROCEDURE — 85025 COMPLETE CBC W/AUTO DIFF WBC: CPT | Mod: ER

## 2019-09-23 PROCEDURE — 80053 COMPREHEN METABOLIC PANEL: CPT | Mod: ER

## 2019-09-23 PROCEDURE — G0425 INPT/ED TELECONSULT30: HCPCS | Mod: GT,,, | Performed by: PSYCHIATRY & NEUROLOGY

## 2019-09-23 NOTE — ED PROVIDER NOTES
"   History     Chief Complaint   Patient presents with    Psychiatric Evaluation       Review of patient's allergies indicates:   Allergen Reactions    Sulfamethoxazole-trimethoprim      Patient says she is Immune to it       History of Present Illness   HPI    2019, 12:50 PM  The history is provided by the patient, Spouse and Todd    Nimo Mahan is a 29 y.o. female presenting to the ED for OPC evaluation.  Patient reportedly had been more aggressive this last week.  Patient has history of bipolar.  Patient had been arrested 6 days ago.  She was released from halfway today.  Family member wanted a psychiatric evaluation make sure everything was okay.  Patient states that there was a home inspection.  She gotten frustrated because the people were going to fix some old.  She is concerned that the mold was going to make her sick.  Currently, patient denies any suicidal ideation, homicidal ideation, difficulty sleeping, hypervigilance, or aggressive behavior.  Patient's spouse states he just wanted her checked out.  Nothing makes it better, nothing makes it worse.    Patient denies currently being on any medication.  Apparently when she was in halfway"  The give her Haldol and Risperdal.      Arrival mode:  Personal Vehicle    PCP: Patrick Munoz MD     Allergies:  Review of patient's allergies indicates:   Allergen Reactions    Sulfamethoxazole-trimethoprim      Patient says she is Immune to it       Past Medical History:  Past Medical History:   Diagnosis Date    Anxiety     Chronic mental illness     Depression     Pulmonary embolus        Past Surgical History:  Past Surgical History:   Procedure Laterality Date     SECTION           Family History:  Family History   Problem Relation Age of Onset    Hyperlipidemia Mother     Hyperlipidemia Father     Kidney disease Father        Social History:  Social History     Tobacco Use    Smoking status: Current Every Day Smoker     Packs/day: " 0.50     Types: Cigarettes    Smokeless tobacco: Never Used   Substance and Sexual Activity    Alcohol use: No    Drug use: Yes     Types: Marijuana    Sexual activity: Yes     Partners: Male        Review of Systems   Review of Systems   Constitutional: Negative for fever.   HENT: Negative for sore throat.    Respiratory: Negative for shortness of breath.    Cardiovascular: Negative for chest pain.   Gastrointestinal: Negative for nausea.   Genitourinary: Negative for difficulty urinating, dysuria, hematuria and urgency.   Musculoskeletal: Negative for back pain.   Skin: Negative for rash.   Neurological: Negative for weakness.   Hematological: Does not bruise/bleed easily.          Physical Exam     Initial Vitals [09/23/19 1243]   BP Pulse Resp Temp SpO2   (!) 150/94 (!) 115 20 98.3 °F (36.8 °C) 99 %      MAP       --          Physical Exam    Nursing Notes and Vital Signs Reviewed.  Constitutional: Patient is in no apparent distress. Well-developed and well-nourished.  Head: Atraumatic. Normocephalic.  Eyes: PERRL. EOM intact. Conjunctivae are not pale. No scleral icterus.  ENT: Mucous membranes are moist. Oropharynx is clear and symmetric.    Neck: Supple. Full ROM. No lymphadenopathy.  Cardiovascular: Regular rate. Regular rhythm. No murmurs, rubs, or gallops. Distal pulses are 2+ and symmetric.  Pulmonary/Chest: No respiratory distress. Clear to auscultation bilaterally. No wheezing or rales.  Abdominal: Soft and non-distended.  There is no tenderness.  No rebound, guarding, or rigidity. Good bowel sounds.  Genitourinary: No CVA tenderness  Musculoskeletal: Moves all extremities. No obvious deformities. No edema. No calf tenderness.  Skin: Warm and dry.  Neurological:  Alert, awake, and appropriate.  Normal speech.  No acute focal neurological deficits are appreciated.  Psychiatric: Normal affect. Good eye contact. Appropriate in content.     ED Course     Procedures    ED Vital Signs:  Vitals:     09/23/19 1243 09/23/19 1436   BP: (!) 150/94 135/89   Pulse: (!) 115 85   Resp: 20 18   Temp: 98.3 °F (36.8 °C) 98.5 °F (36.9 °C)   TempSrc: Oral    SpO2: 99% 99%   Weight: 79.3 kg (174 lb 13.2 oz)        Abnormal Lab Results:  Labs Reviewed   CBC W/ AUTO DIFFERENTIAL - Abnormal; Notable for the following components:       Result Value    Platelets 377 (*)     All other components within normal limits   COMPREHENSIVE METABOLIC PANEL - Abnormal; Notable for the following components:    Potassium 3.1 (*)     All other components within normal limits   URINALYSIS, REFLEX TO URINE CULTURE - Abnormal; Notable for the following components:    Appearance, UA Hazy (*)     Ketones, UA Trace (*)     Bilirubin (UA) 1+ (*)     Occult Blood UA Trace (*)     All other components within normal limits    Narrative:     Preferred Collection Type->Urine, Clean Catch   ACETAMINOPHEN LEVEL - Abnormal; Notable for the following components:    Acetaminophen (Tylenol), Serum <3.0 (*)     All other components within normal limits   SALICYLATE LEVEL - Abnormal; Notable for the following components:    Salicylate Lvl <5.0 (*)     All other components within normal limits   TSH   DRUG SCREEN PANEL, URINE EMERGENCY    Narrative:     Preferred Collection Type->Urine, Clean Catch   ALCOHOL,MEDICAL (ETHANOL)   PREGNANCY TEST, URINE RAPID        All Lab Results:  Results for orders placed or performed during the hospital encounter of 09/23/19   CBC auto differential   Result Value Ref Range    WBC 7.53 3.90 - 12.70 K/uL    RBC 4.77 4.00 - 5.40 M/uL    Hemoglobin 13.3 12.0 - 16.0 g/dL    Hematocrit 39.4 37.0 - 48.5 %    Mean Corpuscular Volume 83 82 - 98 fL    Mean Corpuscular Hemoglobin 27.9 27.0 - 31.0 pg    Mean Corpuscular Hemoglobin Conc 33.8 32.0 - 36.0 g/dL    RDW 12.7 11.5 - 14.5 %    Platelets 377 (H) 150 - 350 K/uL    MPV 10.1 9.2 - 12.9 fL    Gran # (ANC) 3.9 1.8 - 7.7 K/uL    Lymph # 3.1 1.0 - 4.8 K/uL    Mono # 0.5 0.3 - 1.0 K/uL     Eos # 0.0 0.0 - 0.5 K/uL    Baso # 0.02 0.00 - 0.20 K/uL    Gran% 52.5 38.0 - 73.0 %    Lymph% 40.8 18.0 - 48.0 %    Mono% 6.0 4.0 - 15.0 %    Eosinophil% 0.4 0.0 - 8.0 %    Basophil% 0.3 0.0 - 1.9 %    Differential Method Automated    Comprehensive metabolic panel   Result Value Ref Range    Sodium 142 136 - 145 mmol/L    Potassium 3.1 (L) 3.5 - 5.1 mmol/L    Chloride 103 95 - 110 mmol/L    CO2 28 23 - 29 mmol/L    Glucose 103 70 - 110 mg/dL    BUN, Bld 11 6 - 20 mg/dL    Creatinine 0.8 0.5 - 1.4 mg/dL    Calcium 9.3 8.7 - 10.5 mg/dL    Total Protein 7.8 6.0 - 8.4 g/dL    Albumin 4.2 3.5 - 5.2 g/dL    Total Bilirubin 0.7 0.1 - 1.0 mg/dL    Alkaline Phosphatase 73 55 - 135 U/L    AST 19 10 - 40 U/L    ALT 18 10 - 44 U/L    Anion Gap 11 8 - 16 mmol/L    eGFR if African American >60.0 >60 mL/min/1.73 m^2    eGFR if non African American >60.0 >60 mL/min/1.73 m^2   TSH   Result Value Ref Range    TSH 0.496 0.400 - 4.000 uIU/mL   Urinalysis, Reflex to Urine Culture Urine, Clean Catch   Result Value Ref Range    Specimen UA Urine, Clean Catch     Color, UA Yellow Yellow, Straw, Sarah    Appearance, UA Hazy (A) Clear    pH, UA 6.0 5.0 - 8.0    Specific Gravity, UA 1.025 1.005 - 1.030    Protein, UA Negative Negative    Glucose, UA Negative Negative    Ketones, UA Trace (A) Negative    Bilirubin (UA) 1+ (A) Negative    Occult Blood UA Trace (A) Negative    Nitrite, UA Negative Negative    Urobilinogen, UA <2.0 <2.0 EU/dL    Leukocytes, UA Negative Negative   Drug screen panel, emergency   Result Value Ref Range    Benzodiazepines Negative     Methadone metabolites Negative     Cocaine (Metab.) Negative     Opiate Scrn, Ur Negative     Barbiturate Screen, Ur Negative     Amphetamine Screen, Ur Negative     THC Presumptive Positive     Phencyclidine Negative     Creatinine, Random Ur 284.8 15.0 - 325.0 mg/dL    Toxicology Information SEE COMMENT    Ethanol   Result Value Ref Range    Alcohol, Medical, Serum <10 <10 mg/dL    Acetaminophen level   Result Value Ref Range    Acetaminophen (Tylenol), Serum <3.0 (L) 10.0 - 20.0 ug/mL   Salicylate level   Result Value Ref Range    Salicylate Lvl <5.0 (L) 15.0 - 30.0 mg/dL   Pregnancy, urine rapid   Result Value Ref Range    Preg Test, Ur Negative                  Imaging Results:  Imaging Results    None          The Emergency Provider reviewed the vital signs and test results, which are outlined above.     ED Discussion        2:07 PM Spoke with Dr. CESAR Marvin, he does not recommend PEC of the time.    2:29 PM Reassessment: Dr. Guo reassessed the pt.   feels comfortable taking her home.  Discussed indications to return to the emergency department.  The pt is resting comfortably and is NAD.  Pt states their sx have improved. Discussed test results, shared treatment plan, specific conditions for return, and the need for f/u.  Answered their questions at this time.  Pt understands and agrees to the plan.  The pt has remained hemodynamically stable through ED course and is stable for discharge.      I discussed with patient and/or family/caretaker that evaluation in the ED does not suggest any emergent or life threatening medical conditions requiring immediate intervention beyond what was provided in the ED, and I believe patient is safe for discharge.  Regardless, an unremarkable evaluation in the ED does not preclude the development or presence of a serious of life threatening condition. As such, patient was instructed to return immediately for any worsening or change in current symptoms.      ED Medication(s):  Medications - No data to display       Medication List      ASK your doctor about these medications    ALPRAZolam 0.5 MG tablet  Commonly known as:  XANAX     escitalopram oxalate 20 MG tablet  Commonly known as:  LEXAPRO     haloperidol 10 MG tablet  Commonly known as:  HALDOL     risperiDONE 2 MG tablet  Commonly known as:  RISPERDAL     sertraline 100 MG  "tablet  Commonly known as:  ZOLOFT            Follow-up Information     [facility] In 2 days.    Why:  Return to emergency department for:  Suicidal thoughts, homicidal thoughts, seeing or hearing voices that are not there, or other concerns.  Contact information:  [address] [facility]  [place] [address]  [phone]                        MIPS Measures     Smoker? Yes     Hypertension: Pre-hypertension/Hypertension: The pt has been informed that they may have pre-hypertension or hypertension based on a blood pressure reading in the ED. I recommend that the pt call the PCP listed on their discharge instructions or a physician of their choice this week to arrange f/u for further evaluation of possible pre-hypertension or hypertension.        Medical Decision Making     Medical Decision Making:   Clinical Tests:   Lab Tests: Ordered and Reviewed  Radiological Study: Ordered and Reviewed  Medical Tests: Ordered and Reviewed       Additional MDM:   Smoking Cessation: The patient is a smoker. The patient was counseled on smoking cessation for: 3 minutes. The patient was counseled on tobacco related  health complications. The patient was referred to a tobacco treatment program.        Coding      Portions of this note may have been created with voice recognition software. Occasional "wrong-word" or "sound-a-like" substitutions may have occurred due to the inherent limitations of voice recognition software. Please, read the note carefully and recognize, using context, where substitutions have occurred.        Clinical Impression       ICD-10-CM ICD-9-CM   1. Tobacco abuse Z72.0 305.1   2. History of bipolar disorder Z86.59 V11.1            Disposition: Discharge to home  Patient condition: Stable           Natalya Guo, DO  09/23/19 1910    "

## 2019-09-23 NOTE — ED NOTES
Spoke with  regarding his concerns, he states that pt hasn't been taking any medications until she was put in half-way and then she was given risperdal and haldol. Not sure about other medications. Also states that pt is usually calm and passive, he thinks something in the environment triggered her behavior. She was in half-way for one week and has never been in half-way before and has no criminal past.  not sure of diagnoses either.

## 2019-09-23 NOTE — CONSULTS
Tele-Consultation to Emergency Department from Psychiatry    Please see previous notes:    From 09/06/18:  Patient presents with    Psychiatric Evaluation       [facility] called by family. Informed dispatch patient was having bizzare behavior and has not been taking medications lately.    Patient currently presents via EMS with concerns regarding erratic behavior.   The patient has had previous encounters for acute psychosis though the family is unaware of a specific diagnosis.  They note that she has not taken her medications since being discharged from [facility] after an inpatient stay earlier this year.    EMS was called secondary to what they describe as bizarre behavior including throwing bricks at random buildings and offering unusual conversation with questionable verbal hallucinations.  The patient unfortunately is not communicative at this point and is not offering additional history.  The  who is at bedside appears to be heavily intoxicated himself and is not able to offer much history aside from that stated above.      Patient agreeable to consultation via telepsychiatry.    Start time of consultation: 1:20 pm    The chief complaint leading to psychiatric consultation is: bizarre behavior  This consultation is from the Emergency Department attending physician Dr. Natalya Guo.   The location of the consulting psychiatrist is [address] [place] [address].  The patient location is [facility].     Patient Identification:  Nimo Mahan is a 29 y.o. female.    Patient information was obtained from patient.    History of Present Illness:    As per ER MD: Loud outbursts    Pt. Denies having any mental problem, says, that she is fine.    Pt. Agreeable to me speaking separately in ER with:   Partner Todd, [phone]: pt. Has appeared mentally stable in the past few months; about a week ago was taken to half-way after her house was inspected and she reportedly got upset[Todd  "was not present].    Pt. Agreeable to me calling mother, Natalya, [phone]: no answer    Psychiatric History:   Hospitalization: yes, possibly 3, most recent a few months ago  Medication Trials: Risperdal  Suicide Attempts: denies  Violence: denies  Depression: in the past  Ness: denies  AH's: denies  Delusions: denies    Review of Systems:  Some sleepiness    Past Medical History:   Past Medical History:   Diagnosis Date    Anxiety     Chronic mental illness     Depression     Pulmonary embolus       Seizures: denies  Head trauma/l.o.c.: denies head trauma with l.o.c.  Wish to become pregnant in the immediate future[if female of childbearing age]: denies    Allergies:   Review of patient's allergies indicates:   Allergen Reactions    Sulfamethoxazole-trimethoprim      Patient says she is Immune to it     Medications in ER: Medications - No data to display    Medications at home: denies    Substance Abuse History:   Alchohol: "not much", occasional small amount  Drug: denies, denies IVDA     Legal History:   Past charges/incarcerations: released from prison today[was in prison for about a week]  Pending charges: disturbing peace    Family Psychiatric History:   "not that I know of"    Social History:   History of Physical/Sexual Abuse: denies  Education: some college    Employment/Disability: last worked about a year ago   Financial: no current income  Relationship Status/Sexual Orientation:    Children: 2, ages 3 and 6, states, that children live with pt.'s mother   Housing Status:   Congregation: believes in God   History: denies   Recreational Activities: pool, reading, writing  Access to Gun: denies     Current Evaluation:     Constitutional  Vitals:  Vitals:    09/23/19 1243   BP: (!) 150/94   Pulse: (!) 115   Resp: 20   Temp: 98.3 °F (36.8 °C)   TempSrc: Oral   SpO2: 99%   Weight: 79.3 kg (174 lb 13.2 oz)      General:  unremarkable, age appropriate     Musculoskeletal  Muscle " Strength/Tone:   moving arms normally   Gait & Station:   sitting on stretcher     Psychiatric  Level of Consciousness: alert  Orientation: oriented to person, place and time  Grooming: in hospital gown  Psychomotor Behavior: no agitation  Speech: normal in rate, rhythm and volume  Language: uses words appropriately  Mood: steady  Affect: full range  Thought Process: goal directed  Associations: intact  Thought Content: denies SI, denies HI  Memory: grossly intact  Attention: intact to interview  Insight: appears fair  Judgement: appears fair    Relevant Elements of Neurological Exam: no abnormality of posture noted    Assessment - Diagnosis - Goals:     Diagnosis/Impression:   Psychosis, unspecified[by history]    Based on currently available information pt. Does not meet criteria for PEC.    Case d/w ER MD Dr. Guo.    Rec:   - outpatient psychiatric f/u asap     Time with patient: 20 min    Laboratory Data:   Labs Reviewed   CBC W/ AUTO DIFFERENTIAL   COMPREHENSIVE METABOLIC PANEL   TSH   URINALYSIS, REFLEX TO URINE CULTURE   DRUG SCREEN PANEL, URINE EMERGENCY   ALCOHOL,MEDICAL (ETHANOL)   ACETAMINOPHEN LEVEL   SALICYLATE LEVEL   PREGNANCY TEST, URINE RAPID

## 2020-07-13 NOTE — ED NOTES
Changed to Olmesartan 40mg #90 and 1 refill, pt notified.    MD Harley consulting with Staci with vascular surgery at this time.

## 2022-02-14 ENCOUNTER — HOSPITAL ENCOUNTER (EMERGENCY)
Facility: HOSPITAL | Age: 32
Discharge: PSYCHIATRIC OR SUBSTANCE USE FACILITY | End: 2022-02-14
Attending: EMERGENCY MEDICINE
Payer: OTHER GOVERNMENT

## 2022-02-14 VITALS
DIASTOLIC BLOOD PRESSURE: 87 MMHG | TEMPERATURE: 98 F | SYSTOLIC BLOOD PRESSURE: 147 MMHG | RESPIRATION RATE: 16 BRPM | OXYGEN SATURATION: 98 % | HEART RATE: 97 BPM | HEIGHT: 66 IN | WEIGHT: 160 LBS | BODY MASS INDEX: 25.71 KG/M2

## 2022-02-14 DIAGNOSIS — F20.9 ACUTE EXACERBATION OF CHRONIC SCHIZOPHRENIA: Primary | ICD-10-CM

## 2022-02-14 DIAGNOSIS — F31.9 BIPOLAR AFFECTIVE DISORDER, REMISSION STATUS UNSPECIFIED: ICD-10-CM

## 2022-02-14 DIAGNOSIS — Z91.199 MEDICALLY NONCOMPLIANT: ICD-10-CM

## 2022-02-14 LAB
ALBUMIN SERPL BCP-MCNC: 3.9 G/DL (ref 3.5–5.2)
ALP SERPL-CCNC: 79 U/L (ref 55–135)
ALT SERPL W/O P-5'-P-CCNC: 13 U/L (ref 10–44)
AMPHET+METHAMPHET UR QL: NEGATIVE
ANION GAP SERPL CALC-SCNC: 14 MMOL/L (ref 8–16)
APAP SERPL-MCNC: <3 UG/ML (ref 10–20)
AST SERPL-CCNC: 16 U/L (ref 10–40)
B-HCG UR QL: NEGATIVE
BARBITURATES UR QL SCN>200 NG/ML: NEGATIVE
BASOPHILS # BLD AUTO: 0.04 K/UL (ref 0–0.2)
BASOPHILS NFR BLD: 0.5 % (ref 0–1.9)
BENZODIAZ UR QL SCN>200 NG/ML: NEGATIVE
BILIRUB SERPL-MCNC: 0.6 MG/DL (ref 0.1–1)
BILIRUB UR QL STRIP: NEGATIVE
BUN SERPL-MCNC: 4 MG/DL (ref 6–20)
BZE UR QL SCN: NEGATIVE
CALCIUM SERPL-MCNC: 9.4 MG/DL (ref 8.7–10.5)
CANNABINOIDS UR QL SCN: NEGATIVE
CHLORIDE SERPL-SCNC: 104 MMOL/L (ref 95–110)
CLARITY UR REFRACT.AUTO: CLEAR
CO2 SERPL-SCNC: 21 MMOL/L (ref 23–29)
COLOR UR AUTO: YELLOW
CREAT SERPL-MCNC: 0.9 MG/DL (ref 0.5–1.4)
CREAT UR-MCNC: 117.2 MG/DL (ref 15–325)
CTP QC/QA: YES
DIFFERENTIAL METHOD: ABNORMAL
EOSINOPHIL # BLD AUTO: 0 K/UL (ref 0–0.5)
EOSINOPHIL NFR BLD: 0.4 % (ref 0–8)
ERYTHROCYTE [DISTWIDTH] IN BLOOD BY AUTOMATED COUNT: 13.5 % (ref 11.5–14.5)
EST. GFR  (AFRICAN AMERICAN): >60 ML/MIN/1.73 M^2
EST. GFR  (NON AFRICAN AMERICAN): >60 ML/MIN/1.73 M^2
ETHANOL SERPL-MCNC: <10 MG/DL
GLUCOSE SERPL-MCNC: 121 MG/DL (ref 70–110)
GLUCOSE UR QL STRIP: NEGATIVE
HCT VFR BLD AUTO: 46.2 % (ref 37–48.5)
HGB BLD-MCNC: 15.3 G/DL (ref 12–16)
HGB UR QL STRIP: NEGATIVE
IMM GRANULOCYTES # BLD AUTO: 0.02 K/UL (ref 0–0.04)
IMM GRANULOCYTES NFR BLD AUTO: 0.2 % (ref 0–0.5)
KETONES UR QL STRIP: NEGATIVE
LEUKOCYTE ESTERASE UR QL STRIP: NEGATIVE
LYMPHOCYTES # BLD AUTO: 5.3 K/UL (ref 1–4.8)
LYMPHOCYTES NFR BLD: 62.2 % (ref 18–48)
MCH RBC QN AUTO: 27.6 PG (ref 27–31)
MCHC RBC AUTO-ENTMCNC: 33.1 G/DL (ref 32–36)
MCV RBC AUTO: 83 FL (ref 82–98)
METHADONE UR QL SCN>300 NG/ML: NEGATIVE
MONOCYTES # BLD AUTO: 0.4 K/UL (ref 0.3–1)
MONOCYTES NFR BLD: 4.5 % (ref 4–15)
NEUTROPHILS # BLD AUTO: 2.8 K/UL (ref 1.8–7.7)
NEUTROPHILS NFR BLD: 32.2 % (ref 38–73)
NITRITE UR QL STRIP: NEGATIVE
NRBC BLD-RTO: 0 /100 WBC
OPIATES UR QL SCN: NEGATIVE
PCP UR QL SCN>25 NG/ML: NEGATIVE
PH UR STRIP: 6 [PH] (ref 5–8)
PLATELET # BLD AUTO: 473 K/UL (ref 150–450)
PMV BLD AUTO: 9.9 FL (ref 9.2–12.9)
POTASSIUM SERPL-SCNC: 3.8 MMOL/L (ref 3.5–5.1)
PROT SERPL-MCNC: 7.7 G/DL (ref 6–8.4)
PROT UR QL STRIP: NEGATIVE
RBC # BLD AUTO: 5.55 M/UL (ref 4–5.4)
SALICYLATES SERPL-MCNC: <5 MG/DL (ref 15–30)
SARS-COV-2 RDRP RESP QL NAA+PROBE: NEGATIVE
SODIUM SERPL-SCNC: 139 MMOL/L (ref 136–145)
SP GR UR STRIP: 1.01 (ref 1–1.03)
TOXICOLOGY INFORMATION: NORMAL
TSH SERPL DL<=0.005 MIU/L-ACNC: 2.23 UIU/ML (ref 0.4–4)
URN SPEC COLLECT METH UR: NORMAL
UROBILINOGEN UR STRIP-ACNC: NEGATIVE EU/DL
WBC # BLD AUTO: 8.51 K/UL (ref 3.9–12.7)

## 2022-02-14 PROCEDURE — U0002 COVID-19 LAB TEST NON-CDC: HCPCS | Mod: ER | Performed by: EMERGENCY MEDICINE

## 2022-02-14 PROCEDURE — 85025 COMPLETE CBC W/AUTO DIFF WBC: CPT | Mod: ER | Performed by: EMERGENCY MEDICINE

## 2022-02-14 PROCEDURE — 80143 DRUG ASSAY ACETAMINOPHEN: CPT | Mod: ER | Performed by: EMERGENCY MEDICINE

## 2022-02-14 PROCEDURE — 87389 HIV-1 AG W/HIV-1&-2 AB AG IA: CPT | Performed by: EMERGENCY MEDICINE

## 2022-02-14 PROCEDURE — 99285 EMERGENCY DEPT VISIT HI MDM: CPT | Mod: 25,ER

## 2022-02-14 PROCEDURE — 81003 URINALYSIS AUTO W/O SCOPE: CPT | Mod: ER,59 | Performed by: EMERGENCY MEDICINE

## 2022-02-14 PROCEDURE — 80307 DRUG TEST PRSMV CHEM ANLYZR: CPT | Mod: ER | Performed by: EMERGENCY MEDICINE

## 2022-02-14 PROCEDURE — 82077 ASSAY SPEC XCP UR&BREATH IA: CPT | Mod: ER | Performed by: EMERGENCY MEDICINE

## 2022-02-14 PROCEDURE — 80053 COMPREHEN METABOLIC PANEL: CPT | Mod: ER | Performed by: EMERGENCY MEDICINE

## 2022-02-14 PROCEDURE — 86803 HEPATITIS C AB TEST: CPT | Performed by: EMERGENCY MEDICINE

## 2022-02-14 PROCEDURE — 81025 URINE PREGNANCY TEST: CPT | Mod: ER | Performed by: EMERGENCY MEDICINE

## 2022-02-14 PROCEDURE — 84443 ASSAY THYROID STIM HORMONE: CPT | Mod: ER | Performed by: EMERGENCY MEDICINE

## 2022-02-14 PROCEDURE — 80179 DRUG ASSAY SALICYLATE: CPT | Mod: ER | Performed by: EMERGENCY MEDICINE

## 2022-02-14 NOTE — ED PROVIDER NOTES
Encounter Date: 2022       History     Chief Complaint   Patient presents with    Psychiatric Evaluation     Family filled out OPC for hostile behavior      Here by police under OPC executed apparently by her estranged .  Underlying history of bipolar affective disorder and schizophrenia, anxiety, depression, pulmonary emboli.  She is brought for generalized hostility and aggression against family and friends, refusing to take medication, apparently not engaged in any sort of mental health follow-up.  She denies physical complaints, denies all elements of past history, denies all problems, and discharged in unreliable and hostile historian.  She is not combative or violent, she does cooperate with the basics of emergency room evaluation, but she does not provide meaningful answers to most questions.  There is no report of substance abuse or act of harm to self or others.    The history is provided by the police and the patient. The history is limited by the condition of the patient. No  was used.     Review of patient's allergies indicates:   Allergen Reactions    Sulfamethoxazole-trimethoprim      Patient says she is Immune to it     Past Medical History:   Diagnosis Date    Anxiety     Chronic mental illness     Depression     Pulmonary embolus      Past Surgical History:   Procedure Laterality Date     SECTION       Family History   Problem Relation Age of Onset    Hyperlipidemia Mother     Hyperlipidemia Father     Kidney disease Father      Social History     Tobacco Use    Smoking status: Current Every Day Smoker     Packs/day: 0.50     Types: Cigarettes    Smokeless tobacco: Never Used   Substance Use Topics    Alcohol use: No    Drug use: Yes     Types: Marijuana     Review of Systems   Constitutional: Negative for activity change, fatigue and fever.   HENT: Negative for congestion, ear pain, facial swelling, nosebleeds, sinus pressure and sore throat.     Eyes: Negative for pain, discharge, redness and visual disturbance.   Respiratory: Negative for cough, choking, chest tightness, shortness of breath and wheezing.    Cardiovascular: Negative for chest pain, palpitations and leg swelling.   Gastrointestinal: Negative for abdominal distention, abdominal pain, nausea and vomiting.   Endocrine: Negative for heat intolerance, polydipsia and polyuria.   Genitourinary: Negative for difficulty urinating, dysuria, flank pain, hematuria and urgency.   Musculoskeletal: Negative for back pain, gait problem, joint swelling and myalgias.   Skin: Negative for color change and rash.   Allergic/Immunologic: Negative for environmental allergies and food allergies.   Neurological: Negative for dizziness, weakness, numbness and headaches.   Hematological: Negative for adenopathy. Does not bruise/bleed easily.   Psychiatric/Behavioral: Positive for behavioral problems. Negative for agitation. The patient is not nervous/anxious.         See HPI   All other systems reviewed and are negative.      Physical Exam     Initial Vitals [02/14/22 0955]   BP Pulse Resp Temp SpO2   (!) 143/98 (!) 148 16 98 °F (36.7 °C) 100 %      MAP       --         Physical Exam    Nursing note and vitals reviewed.  Constitutional: She appears well-developed and well-nourished. She is not diaphoretic. No distress.   Poor hygeine   HENT:   Head: Normocephalic and atraumatic.   Mouth/Throat: No oropharyngeal exudate.   Eyes: Conjunctivae and EOM are normal. Pupils are equal, round, and reactive to light. Right eye exhibits no discharge. Left eye exhibits no discharge. No scleral icterus.   Neck: Neck supple. No thyromegaly present. No tracheal deviation present. No JVD present.   Normal range of motion.  Cardiovascular: Normal rate, regular rhythm, normal heart sounds and intact distal pulses. Exam reveals no gallop and no friction rub.    No murmur heard.  Pulmonary/Chest: Breath sounds normal. No stridor. No  respiratory distress. She has no wheezes. She has no rhonchi. She has no rales. She exhibits no tenderness.   Abdominal: Abdomen is soft. Bowel sounds are normal. She exhibits no distension and no mass. There is no abdominal tenderness. There is no rebound and no guarding.   Musculoskeletal:         General: No tenderness or edema. Normal range of motion.      Cervical back: Normal range of motion and neck supple.     Neurological: She is alert and oriented to person, place, and time. She has normal strength.   Skin: Skin is warm and dry. No rash and no abscess noted. No erythema.   Psychiatric:   Poor insight, poor judgment, unreliable answers to questions, significant global denial.  She does cooperate with the basics of ER evaluation but does not cooperate well with meaningful questions.  She is alert and oriented times name, location, circumstance, but is off by 1 or 2 years for calendar year.  Denies suicidal or homicidal ideation, denies delusion or hallucination.  Denies alcohol or drug use.  Judged unreliable and hostile historian but no evidence of combative or violent behavior.  Judged gravely disabled.  Unable to adequately test memory.         ED Course   Procedures  Labs Reviewed   CBC W/ AUTO DIFFERENTIAL - Abnormal; Notable for the following components:       Result Value    RBC 5.55 (*)     Platelets 473 (*)     Lymph # 5.3 (*)     Gran % 32.2 (*)     Lymph % 62.2 (*)     All other components within normal limits    Narrative:     Release to patient->Immediate   COMPREHENSIVE METABOLIC PANEL - Abnormal; Notable for the following components:    CO2 21 (*)     Glucose 121 (*)     BUN 4 (*)     All other components within normal limits    Narrative:     Release to patient->Immediate   ACETAMINOPHEN LEVEL - Abnormal; Notable for the following components:    Acetaminophen (Tylenol), Serum <3.0 (*)     All other components within normal limits    Narrative:     Release to patient->Immediate   SALICYLATE  LEVEL - Abnormal; Notable for the following components:    Salicylate Lvl <5.0 (*)     All other components within normal limits    Narrative:     Release to patient->Immediate   TSH    Narrative:     Release to patient->Immediate   URINALYSIS, REFLEX TO URINE CULTURE    Narrative:     Specimen Source->Urine   DRUG SCREEN PANEL, URINE EMERGENCY    Narrative:     Specimen Source->Urine   ALCOHOL,MEDICAL (ETHANOL)    Narrative:     Release to patient->Immediate   PREGNANCY TEST, URINE RAPID    Narrative:     Specimen Source->Urine   HIV 1 / 2 ANTIBODY   HEPATITIS C ANTIBODY   HEP C VIRUS HOLD SPECIMEN   SARS-COV-2 RDRP GENE           11:17 AM Medically cleared for Psych placement and transfer.    All historical, clinical, radiographic, and laboratory findings were reviewed with the patient/family in detail along with the indications for transfer to an outside facility (rather than admission to our facility in [place]) secondary to acute psychiatric illness exacerbation and a need for inpatient Psych eval and rx given the diagnosis of schizophrenia exacerbation.  All remaining questions and concerns were addressed at that time and the patient/family communicates understanding and agrees to proceed accordingly. Patient will be transferred by [facility] secondary to a need for ongoing psychiatric monitoring and safety precautions en route.  Juan Rodas MD  11:18 AM         Imaging Results    None          Medications - No data to display                       Clinical Impression:   Final diagnoses:  [F20.9] Acute exacerbation of chronic schizophrenia (Primary)  [F31.9] Bipolar affective disorder, remission status unspecified  [Z91.19] Medically noncompliant          ED Disposition Condition    Transfer to Psych Facility         ED Prescriptions     None        Follow-up Information    None          Juan Rodas MD  02/14/22 4670

## 2022-02-15 LAB
HCV AB SERPL QL IA: NEGATIVE
HIV 1+2 AB+HIV1 P24 AG SERPL QL IA: NEGATIVE

## 2022-06-19 NOTE — ED NOTES
"Pt to ER per [facility] followed by  with C/O of delusions, and bizarre behavior. Pt admitted she "don't remember when I took my pills".                                                         "
Apple juice given to patient, per patient request.  
Blood acquired per straight stickBLAKE  
Called Han at [place] to give patient update.    Pt left via SPD with 2 SPD officials and [employer] for escort.   
DR. MAXWELL HAS NOTIFIED ME THAT PT WILL BE PLACED UNDER A PEC.   
Nurse Roberto informed of acceptance.   
PEC faxed to [facility] for assistance with PEC placement.  
PEC received to [facility].   
[facility]. Spoke to Han. Accepting doctor is Dr. Zapata. Call for report at [phone].    
Patient became aggressive and paranoid. Informed by JAS Carvajal, the patient has become aggressive progressively over tiime. Attempted to deescalate the situation. Unsuccessful.     MD Harley, informed.   
Patient dressed out into [employer].     
Patient faxed out to adult facilities: [facility], [facility], [facility], [facility], [facility], [facility], [facility], [facility], [facility], [facility], [facility], [facility], [facility], [facility], [facility], and [facility].   
Patient placed medication in mouth only to spit it across the room. MD informed.     Orders received.     
Patient verbally verified and Spelled Full Name and Date of Birth.   
[facility] was [facility]: [facility], [facility], [facility], and [facility].  
Per Dr. Souza, Pt medically clear at this time.    
Pt quite not not answer the telemedicine psyc questions, pt just smiled at the DrPadmini   
Pt refusing to change into paper scrubs. Security called.     
[facility] contacted to initiate tele psych consult.   
Received report from CRISTHIAN Perez.     Pt denies any complaints at this time. States she understands what is going on. Understands PEC protocol. TelePsych consult completed. VSS. Will continue to monitor.     JAS Carvajal at bedside.    
Repot to CRISTHIAN Short at [facility].    
SPD called to report they are delayed for 1 hour. Felisa MORROW made aware.   
Telepsych consult initiated at this time with regional referral center, spoke with Leonor. Physician to call back via tele-cart shortly.  
Todd, patient , informed of patient admission to [facility].     
Transport set up with [facility] at this time.  
19-Jun-2022 00:27

## 2022-06-26 ENCOUNTER — HOSPITAL ENCOUNTER (EMERGENCY)
Facility: HOSPITAL | Age: 32
Discharge: HOME | End: 2022-06-26
Attending: EMERGENCY MEDICINE
Payer: MEDICAID

## 2022-06-26 VITALS
SYSTOLIC BLOOD PRESSURE: 148 MMHG | BODY MASS INDEX: 27.95 KG/M2 | RESPIRATION RATE: 16 BRPM | TEMPERATURE: 98 F | HEIGHT: 66 IN | WEIGHT: 173.94 LBS | HEART RATE: 119 BPM | OXYGEN SATURATION: 97 % | DIASTOLIC BLOOD PRESSURE: 99 MMHG

## 2022-06-26 DIAGNOSIS — Z76.0 MEDICATION REFILL: Primary | ICD-10-CM

## 2022-06-26 DIAGNOSIS — F32.A DEPRESSION, UNSPECIFIED DEPRESSION TYPE: ICD-10-CM

## 2022-06-26 PROCEDURE — 99284 EMERGENCY DEPT VISIT MOD MDM: CPT | Mod: ER

## 2022-06-26 RX ORDER — SERTRALINE HYDROCHLORIDE 100 MG/1
100 TABLET, FILM COATED ORAL DAILY
Qty: 30 TABLET | Refills: 0 | Status: SHIPPED | OUTPATIENT
Start: 2022-06-26

## 2022-06-26 RX ORDER — RISPERIDONE 2 MG/1
2 TABLET ORAL 2 TIMES DAILY
Qty: 60 TABLET | Refills: 0 | Status: SHIPPED | OUTPATIENT
Start: 2022-06-26

## 2022-06-26 RX ORDER — HALOPERIDOL 10 MG/1
10 TABLET ORAL NIGHTLY
Qty: 30 TABLET | Refills: 0 | Status: SHIPPED | OUTPATIENT
Start: 2022-06-26

## 2022-06-26 NOTE — DISCHARGE INSTRUCTIONS
Regarding DEPRESSION, patient was encouraged to get adequate amount of sleep; follow a healthy, nutritious diet; exercise regularly; avoid alcohol, marijuana, and other recreational drugs; participate in activities that improve happiness; spend time with family and friends; talk to a  or ; and take medications as prescribed.  For treatment, patient advised to contact primary care provider for referral to mental health counselors or psychiatrist and to return to the emergency department for any homicidal or suicidal ideations.     Advised to have close follow up with pcp/psychiatry within one week for review of medications and to adjust as needed. Return to ER if symptoms persist or worsen.

## 2022-06-26 NOTE — ED PROVIDER NOTES
"Encounter Date: 2022       History     Chief Complaint   Patient presents with    Depression     "I feel alone and exhausted, its hard to get sleep" pt states. Patient denies SI/HI. States she is on haldol, states she takes as prescribed.     Pt states she is out of her psych medications and requesting refills.  Denies any HI/SI, hallucinations or delusions.  in room as well and does not feel pt is gravely disabled nor does he feel pt is a danger to herself or others, like she has been in the past.     The history is provided by the patient.   Mental Health Problem  The primary symptoms include depressed mood. The primary symptoms do not include agitation, dysphoric mood, hallucinations, self-injury or suicidal ideas. The current episode started several days ago. This is a recurrent problem.   The degree of incapacity that she is experiencing as a consequence of her illness is mild. Additional symptoms of the illness include psychomotor retardation. Additional symptoms of the illness do not include agitation. She does not admit to suicidal ideas. She does not have a plan to attempt suicide. She does not contemplate harming herself. She has not already injured self. She does not contemplate injuring another person. She has not already  injured another person. Risk factors that are present for mental illness include a history of mental illness (schizophrenia).     Review of patient's allergies indicates:   Allergen Reactions    Sulfamethoxazole-trimethoprim      Patient says she is Immune to it     Past Medical History:   Diagnosis Date    Anxiety     Chronic mental illness     Depression     Pulmonary embolus      Past Surgical History:   Procedure Laterality Date     SECTION       Family History   Problem Relation Age of Onset    Hyperlipidemia Mother     Hyperlipidemia Father     Kidney disease Father      Social History     Tobacco Use    Smoking status: Current Every Day Smoker     " Packs/day: 0.50     Types: Cigarettes    Smokeless tobacco: Never Used   Substance Use Topics    Alcohol use: No    Drug use: Yes     Types: Marijuana     Review of Systems   Constitutional: Negative for fever.   HENT: Negative for sore throat.    Respiratory: Negative for shortness of breath.    Cardiovascular: Negative for chest pain.   Gastrointestinal: Negative for nausea.   Genitourinary: Negative for dysuria.   Musculoskeletal: Negative for back pain.   Skin: Negative for rash.   Neurological: Negative for weakness.   Hematological: Does not bruise/bleed easily.   Psychiatric/Behavioral: Negative for agitation, behavioral problems, confusion, decreased concentration, dysphoric mood, hallucinations, self-injury, sleep disturbance and suicidal ideas. The patient is not nervous/anxious and is not hyperactive.    All other systems reviewed and are negative.      Physical Exam     Initial Vitals [06/26/22 0658]   BP Pulse Resp Temp SpO2   (!) 148/99 (!) 119 16 97.6 °F (36.4 °C) 97 %      MAP       --         Physical Exam    Nursing note and vitals reviewed.  Constitutional: She appears well-developed and well-nourished.   HENT:   Head: Normocephalic and atraumatic.   Mouth/Throat: No oropharyngeal exudate.   Eyes: Conjunctivae and EOM are normal. Pupils are equal, round, and reactive to light.   Neck: Neck supple. No thyromegaly present.   Normal range of motion.  Cardiovascular: Normal rate, regular rhythm, normal heart sounds and intact distal pulses. Exam reveals no gallop and no friction rub.    No murmur heard.  Pulmonary/Chest: Breath sounds normal. No respiratory distress. She has no wheezes. She has no rhonchi. She exhibits no tenderness.   Abdominal: Abdomen is soft. Bowel sounds are normal. She exhibits no distension. There is no abdominal tenderness. There is no rebound and no guarding.   Musculoskeletal:         General: No tenderness or edema. Normal range of motion.      Cervical back: Normal  "range of motion and neck supple.     Lymphadenopathy:     She has no cervical adenopathy.   Neurological: She is alert and oriented to person, place, and time. She has normal strength. No cranial nerve deficit or sensory deficit.   Skin: Skin is warm and dry. No rash noted.   Psychiatric: Her speech is normal and behavior is normal. Judgment and thought content normal. Thought content is not paranoid and not delusional. Cognition and memory are normal. She does not express impulsivity or inappropriate judgment. She exhibits a depressed mood. She expresses no homicidal and no suicidal ideation. She expresses no suicidal plans and no homicidal plans.         ED Course   Procedures  Labs Reviewed - No data to display       Imaging Results    None          Medications - No data to display                    Vitals:    06/26/22 0658   BP: (!) 148/99   Pulse: (!) 119   Resp: 16   Temp: 97.6 °F (36.4 °C)   TempSrc: Oral   SpO2: 97%   Weight: 78.9 kg (173 lb 15.1 oz)   Height: 5' 6" (1.676 m)           Imaging Results    None         Medications - No data to display    7:56 AM - Re-evaluation: The patient is resting comfortably and is in no acute distress.  I do not think pt is a danger to herself or others at this point in time,  agrees and will get her back on her home medications with information for close followup. She states that her symptoms have improved after treatment within ER. Discussed shared treatment plan, specific conditions for return, and importance of follow up with patient and family.  She understands and agrees with the plan as discussed. Answered  her questions at this time. She has remained hemodynamically stable throughout the ED course and is appropriate for discharge home.     Regarding DEPRESSION, patient was encouraged to get adequate amount of sleep; follow a healthy, nutritious diet; exercise regularly; avoid alcohol, marijuana, and other recreational drugs; participate in activities that " improve happiness; spend time with family and friends; talk to a  or ; and take medications as prescribed.  For treatment, patient advised to contact primary care provider for referral to mental health counselors or psychiatrist and to return to the emergency department for any homicidal or suicidal ideations.     Pre-hypertension/Hypertension: The pt has been informed that they may have pre-hypertension or hypertension based on a blood pressure reading in the ED. I recommend that the pt call the PCP listed on their discharge instructions or a physician of their choice this week to arrange f/u for further evaluation of possible pre-hypertension or hypertension.     Nimo Mahan was given a handout which discussed their disease process, precautions, and instructions for follow-up and therapy.     Follow-up Information     Patrick Munoz MD. Schedule an appointment as soon as possible for a visit in 1 week.    Specialty: Family Medicine  Contact information:  [address] [facility] [address]  [phone]             [facility] Schedule an appointment as soon as possible for a visit in 1 week.    Specialty: Psychiatry  Contact information:  [address] [facility] [address] [phone]  Additional information:  Please park on the [address] side and use the nic entrance. Check in on the 2nd floor, to the left.           [facility]. Schedule an appointment as soon as possible for a visit in 1 week.    Contact information:  [address]  [place] [address]  [phone]             [facility] - Emergency Dept.    Specialty: Emergency Medicine  Why: As needed, If symptoms worsen  Contact information:  [address]  [facility] [address] [phone]                          Medication List      CONTINUE taking these medications    haloperidoL 10 MG tablet  Commonly  known as: HALDOL  Take 1 tablet (10 mg total) by mouth every evening.     risperiDONE 2 MG tablet  Commonly known as: RISPERDAL  Take 1 tablet (2 mg total) by mouth 2 (two) times daily.     sertraline 100 MG tablet  Commonly known as: ZOLOFT  Take 1 tablet (100 mg total) by mouth once daily.        ASK your doctor about these medications    ALPRAZolam 0.5 MG tablet  Commonly known as: XANAX     EScitalopram oxalate 20 MG tablet  Commonly known as: LEXAPRO           Where to Get Your Medications      You can get these medications from any pharmacy    Bring a paper prescription for each of these medications  · haloperidoL 10 MG tablet  · risperiDONE 2 MG tablet  · sertraline 100 MG tablet        Current Discharge Medication List            ED Diagnosis  1. Medication refill    2. Depression, unspecified depression type             Clinical Impression:   Final diagnoses:  [F32.A] Depression, unspecified depression type  [Z76.0] Medication refill (Primary)          ED Disposition Condition    Discharge Stable        ED Prescriptions     Medication Sig Dispense Start Date End Date Auth. Provider    haloperidoL (HALDOL) 10 MG tablet Take 1 tablet (10 mg total) by mouth every evening. 30 tablet 6/26/2022  Mir Ceballos Jr., MD    risperiDONE (RISPERDAL) 2 MG tablet Take 1 tablet (2 mg total) by mouth 2 (two) times daily. 60 tablet 6/26/2022  Mir Ceballos Jr., MD    sertraline (ZOLOFT) 100 MG tablet Take 1 tablet (100 mg total) by mouth once daily. 30 tablet 6/26/2022  Mir Ceballos Jr., MD        Follow-up Information     Follow up With Specialties Details Why Contact Info Additional Information    Patrick Munoz MD Family Medicine Schedule an appointment as soon as possible for a visit in 1 week  [address] [facility] [address]  [phone]       [facility] Psychiatry Schedule an appointment as soon as possible for a visit in 1 week  [address]  [facility] [address]  [phone] Please park on the [address] side and use the Clnic entrance. Check in on the 2nd floor, to the left.    [facility]  Schedule an appointment as soon as possible for a visit in 1 week  [address]  [place] [address] [phone]       [facility] - Emergency Dept Emergency Medicine  As needed, If symptoms worsen [address]  [facility] [id number] [phone]            Mir Ceballos Jr., MD  06/26/22 0751

## 2022-09-17 NOTE — PLAN OF CARE
Problem: Adult Inpatient Plan of Care  Goal: Plan of Care Review  Outcome: Ongoing (interventions implemented as appropriate)  PT IS RESTING COMFORTABLY IN BED.  SHE HAS SOB, ELEVATED HEART RATE AND O2 SATURATION DROPS TO 90% WHEN PT GETS UP TO TOILET.  PT HAS HAD A TEMP .2 AND RECEIVED TORADOL 10 MG.  PT HAS BEEN DOWN GRADED TO TELEMETRY AND VASCULAR AS RECOMMENDED NO INTERVENTION AT THIS POINT.  PT HAS BEEN ORDERED A DIET.  PT HAS BEEN EDUCATED TO NEED FOR COMPLIANCE WITH BLOOD THINNERS ONCE SHE IS DISCHARGED.  PT AND SPOUSE VERBALIZED UNDERSTANDING.  PT TURNS SELF IN BED.  CALL MARTINES IN REACH.  PT DENIES NEEDS AT PRESENT TIME.    Problem: Bleeding  Goal: Absence of Bleeding  Outcome: Ongoing (interventions implemented as appropriate)  NO BLEEDING NOTED         Attending Attestation (For Attendings USE Only)...

## 2023-07-12 NOTE — CONSULTS
"  [facility] -   Adult Nutrition  Consult Note    SUMMARY     Recommendations    Recommendation/Intervention:  1. When medically able, ADAT to Regular.   2. If unable to adv diet within 72 hrs to at least full liquids, consider nutrition support:  Option 1: Clinimix E 4.25/10 at 80 ml/hr with 20 % 250 ml lipids daily (1479 kcal, 82 g protein, 1.96 mg/kg/min dextrose infusion rate).   Option 2: Clinimix E 4.25/5 at 120 ml/hr with 20 % 250 ml lipids daily (1479 kcal, 122 g protein, 1.47 mg/kg/min dextrose infusion rate).    Goals: Meet > 85 % EEN/EPN while admitted  Nutrition Goal Status: new  Communication of RD Recs: (POC, sticky note)    Reason for Assessment    Reason For Assessment: consult   Dx:  1. Acute saddle pulmonary embolism without acute cor pulmonale    2. Pulmonary infarction    3. Hemoptysis    4. Tobacco abuse    5. PE (pulmonary thromboembolism)    6. Pulmonary embolism      Past Medical History:   Diagnosis Date    Anxiety     Chronic mental illness     Depression     Pulmonary embolus      General info comments: Pt currently in ICU. Pt admitted w/ hemoptysis. Pt NPO. Pt states she was not eating solid foods x 2-3 days PTA, she was only drinking fluids. Pt unsure about wt loss. Per epic records, pt weighed 183 lbs on 9/6/18, current weight - 149 lbs (wt loss of 34 lbs within the last 3 months). NFPE completed 12.20.18. Mild subcutaneous fat and muscle loss.     Nutrition discharge planning: pending medical course     Nutrition Risk Screen    Nutrition Risk Screen: no indicators present(poor appetite)    Nutrition/Diet History    Spiritual, Cultural Beliefs, Congregational Practices, Values that Affect Care: no    Anthropometrics    Temp: 99.9 °F (37.7 °C)  Height Method: Stated  Height: 5' 4" (162.6 cm)  Height (inches): 64 in  Weight Method: Bed Scale  Weight: 68 kg (149 lb 14.6 oz)  Weight (lb): 149.91 lb  Ideal Body Weight (IBW), Female: 120 lb  % Ideal Body Weight, Female (lb): " 124.93 lb  BMI (Calculated): 25.8       Lab/Procedures/Meds    Pertinent Labs Reviewed: reviewed  BMP  Lab Results   Component Value Date     12/20/2018    K 3.8 12/20/2018     12/20/2018    CO2 21 (L) 12/20/2018    BUN 9 12/20/2018    CREATININE 0.7 12/20/2018    CALCIUM 8.6 (L) 12/20/2018    ANIONGAP 12 12/20/2018    ESTGFRAFRICA >60 12/20/2018    EGFRNONAA >60 12/20/2018     Lab Results   Component Value Date    CALCIUM 8.6 (L) 12/20/2018    PHOS 1.8 (L) 12/20/2018    PHOS 1.8 (L) 12/20/2018     Lab Results   Component Value Date    ALBUMIN 2.9 (L) 12/20/2018     No results for input(s): POCTGLUCOSE in the last 24 hours.    Pertinent Medications Reviewed: reviewed    Physical Findings/Assessment     oral: WDL  Skin: Raymond 20     Estimated/Assessed Needs    Weight Used For Calorie Calculations: 68 kg (149 lb 14.6 oz)  Energy Calorie Requirements (kcal): 1668 - 1807  Energy Need Method: Beadle-St Jeor(x1.2- 1.3)  Protein Requirements: 81 g  Weight Used For Protein Calculations: 68 kg (149 lb 14.6 oz)     Estimated Fluid Requirement Method: RDA Method(or per MD)  RDA Method (mL): 1668         Nutrition Prescription Ordered     Current diet: NPO    Evaluation of Received Nutrient/Fluid Intake          Intake/Output Summary (Last 24 hours) at 12/20/2018 1100  Last data filed at 12/20/2018 1000  Gross per 24 hour   Intake 271.08 ml   Output 450 ml   Net -178.92 ml       % Intake of Estimated Energy Needs: 0 - 25 %  % Meal Intake: NPO    Nutrition Risk      2xweekly    Assessment and Plan    Severe malnutrition    Contributing Nutrition Diagnosis  Malnutrition in the context of Acute Illness/Injury    Related to (etiology):  Inadequate energy intake & decreased appetite     Signs and Symptoms (as evidenced by):  Energy Intake: <50% of estimated energy requirement for 5 days  Body Fat Depletion: mild depletion of orbitals and thoracic and lumbar region   Muscle Mass Depletion: mild depletion of temples and  clavicle region   Weight Loss: 19 % within the last 3 months     Interventions/Recommendations (treatment strategy):  Seen above     Nutrition Diagnosis Status:  New            Monitor and Evaluation    Food and Nutrient Intake: energy intake, food and beverage intake  Food and Nutrient Adminstration: diet order, enteral and parenteral nutrition administration  Anthropometric Measurements: weight  Biochemical Data, Medical Tests and Procedures: electrolyte and renal panel, glucose/endocrine profile  Nutrition-Focused Physical Findings: overall appearance     Malnutrition Assessment                 Orbital Region (Subcutaneous Fat Loss): mild depletion  Thoracic and Lumbar Region: mild depletion   Clavicle Bone Region (Muscle Loss): mild depletion  Scapular Bone Region (Muscle Loss): mild depletion                 Nutrition Follow-Up    RD Follow-up?: Yes(2xweekly)     13-Jul-2023 06:07

## 2024-07-03 ENCOUNTER — HOSPITAL ENCOUNTER (EMERGENCY)
Facility: HOSPITAL | Age: 34
Discharge: PSYCHIATRIC OR SUBSTANCE USE FACILITY | End: 2024-07-03
Attending: EMERGENCY MEDICINE
Payer: MEDICAID

## 2024-07-03 VITALS
RESPIRATION RATE: 16 BRPM | TEMPERATURE: 98 F | HEART RATE: 82 BPM | DIASTOLIC BLOOD PRESSURE: 75 MMHG | SYSTOLIC BLOOD PRESSURE: 120 MMHG | BODY MASS INDEX: 29.53 KG/M2 | OXYGEN SATURATION: 100 % | WEIGHT: 183 LBS

## 2024-07-03 DIAGNOSIS — R00.0 TACHYCARDIA: ICD-10-CM

## 2024-07-03 DIAGNOSIS — F20.9 SCHIZOPHRENIA, UNSPECIFIED TYPE: Primary | ICD-10-CM

## 2024-07-03 LAB
ALBUMIN SERPL BCP-MCNC: 3.7 G/DL (ref 3.5–5.2)
ALP SERPL-CCNC: 68 U/L (ref 55–135)
ALT SERPL W/O P-5'-P-CCNC: 19 U/L (ref 10–44)
AMPHET+METHAMPHET UR QL: NEGATIVE
ANION GAP SERPL CALC-SCNC: 11 MMOL/L (ref 8–16)
APAP SERPL-MCNC: <3 UG/ML (ref 10–20)
AST SERPL-CCNC: 18 U/L (ref 10–40)
B-HCG UR QL: NEGATIVE
BARBITURATES UR QL SCN>200 NG/ML: NEGATIVE
BASOPHILS # BLD AUTO: 0.03 K/UL (ref 0–0.2)
BASOPHILS NFR BLD: 0.2 % (ref 0–1.9)
BENZODIAZ UR QL SCN>200 NG/ML: NEGATIVE
BILIRUB SERPL-MCNC: 0.2 MG/DL (ref 0.1–1)
BILIRUB UR QL STRIP: NEGATIVE
BUN SERPL-MCNC: 17 MG/DL (ref 6–20)
BZE UR QL SCN: NEGATIVE
CALCIUM SERPL-MCNC: 10 MG/DL (ref 8.7–10.5)
CANNABINOIDS UR QL SCN: NEGATIVE
CHLORIDE SERPL-SCNC: 106 MMOL/L (ref 95–110)
CLARITY UR REFRACT.AUTO: CLEAR
CO2 SERPL-SCNC: 21 MMOL/L (ref 23–29)
COLOR UR AUTO: YELLOW
CREAT SERPL-MCNC: 0.9 MG/DL (ref 0.5–1.4)
CREAT UR-MCNC: 107.7 MG/DL (ref 15–325)
DIFFERENTIAL METHOD BLD: ABNORMAL
EOSINOPHIL # BLD AUTO: 0.1 K/UL (ref 0–0.5)
EOSINOPHIL NFR BLD: 0.7 % (ref 0–8)
ERYTHROCYTE [DISTWIDTH] IN BLOOD BY AUTOMATED COUNT: 14.5 % (ref 11.5–14.5)
EST. GFR  (NO RACE VARIABLE): >60 ML/MIN/1.73 M^2
ETHANOL SERPL-MCNC: <10 MG/DL
GLUCOSE SERPL-MCNC: 99 MG/DL (ref 70–110)
GLUCOSE UR QL STRIP: NEGATIVE
HCT VFR BLD AUTO: 38.3 % (ref 37–48.5)
HGB BLD-MCNC: 12.9 G/DL (ref 12–16)
HGB UR QL STRIP: ABNORMAL
IMM GRANULOCYTES # BLD AUTO: 0.03 K/UL (ref 0–0.04)
IMM GRANULOCYTES NFR BLD AUTO: 0.2 % (ref 0–0.5)
KETONES UR QL STRIP: NEGATIVE
LEUKOCYTE ESTERASE UR QL STRIP: NEGATIVE
LYMPHOCYTES # BLD AUTO: 5.1 K/UL (ref 1–4.8)
LYMPHOCYTES NFR BLD: 42.4 % (ref 18–48)
MCH RBC QN AUTO: 27.7 PG (ref 27–31)
MCHC RBC AUTO-ENTMCNC: 33.7 G/DL (ref 32–36)
MCV RBC AUTO: 82 FL (ref 82–98)
METHADONE UR QL SCN>300 NG/ML: NEGATIVE
MICROSCOPIC COMMENT: NORMAL
MONOCYTES # BLD AUTO: 0.5 K/UL (ref 0.3–1)
MONOCYTES NFR BLD: 4.4 % (ref 4–15)
NEUTROPHILS # BLD AUTO: 6.3 K/UL (ref 1.8–7.7)
NEUTROPHILS NFR BLD: 52.1 % (ref 38–73)
NITRITE UR QL STRIP: NEGATIVE
NRBC BLD-RTO: 0 /100 WBC
OHS QRS DURATION: 62 MS
OHS QTC CALCULATION: 450 MS
OPIATES UR QL SCN: NEGATIVE
PCP UR QL SCN>25 NG/ML: NEGATIVE
PH UR STRIP: 6 [PH] (ref 5–8)
PLATELET # BLD AUTO: 413 K/UL (ref 150–450)
PMV BLD AUTO: 10.3 FL (ref 9.2–12.9)
POTASSIUM SERPL-SCNC: 3.4 MMOL/L (ref 3.5–5.1)
PROT SERPL-MCNC: 7.7 G/DL (ref 6–8.4)
PROT UR QL STRIP: NEGATIVE
RBC # BLD AUTO: 4.65 M/UL (ref 4–5.4)
RBC #/AREA URNS AUTO: 0 /HPF (ref 0–4)
SALICYLATES SERPL-MCNC: <5 MG/DL (ref 15–30)
SODIUM SERPL-SCNC: 138 MMOL/L (ref 136–145)
SP GR UR STRIP: 1.01 (ref 1–1.03)
TOXICOLOGY INFORMATION: NORMAL
TSH SERPL DL<=0.005 MIU/L-ACNC: 2.78 UIU/ML (ref 0.4–4)
URN SPEC COLLECT METH UR: ABNORMAL
UROBILINOGEN UR STRIP-ACNC: NEGATIVE EU/DL
WBC # BLD AUTO: 12.04 K/UL (ref 3.9–12.7)

## 2024-07-03 PROCEDURE — 85025 COMPLETE CBC W/AUTO DIFF WBC: CPT | Mod: ER | Performed by: EMERGENCY MEDICINE

## 2024-07-03 PROCEDURE — 93005 ELECTROCARDIOGRAM TRACING: CPT | Mod: ER

## 2024-07-03 PROCEDURE — 96360 HYDRATION IV INFUSION INIT: CPT | Mod: ER

## 2024-07-03 PROCEDURE — 80053 COMPREHEN METABOLIC PANEL: CPT | Mod: ER | Performed by: EMERGENCY MEDICINE

## 2024-07-03 PROCEDURE — 93010 ELECTROCARDIOGRAM REPORT: CPT | Mod: ,,, | Performed by: INTERNAL MEDICINE

## 2024-07-03 PROCEDURE — 81000 URINALYSIS NONAUTO W/SCOPE: CPT | Mod: 59,ER | Performed by: EMERGENCY MEDICINE

## 2024-07-03 PROCEDURE — 80179 DRUG ASSAY SALICYLATE: CPT | Mod: ER | Performed by: EMERGENCY MEDICINE

## 2024-07-03 PROCEDURE — 82077 ASSAY SPEC XCP UR&BREATH IA: CPT | Mod: ER | Performed by: EMERGENCY MEDICINE

## 2024-07-03 PROCEDURE — 25000003 PHARM REV CODE 250: Mod: ER | Performed by: EMERGENCY MEDICINE

## 2024-07-03 PROCEDURE — 84443 ASSAY THYROID STIM HORMONE: CPT | Mod: ER | Performed by: EMERGENCY MEDICINE

## 2024-07-03 PROCEDURE — 80307 DRUG TEST PRSMV CHEM ANLYZR: CPT | Mod: ER | Performed by: EMERGENCY MEDICINE

## 2024-07-03 PROCEDURE — 80143 DRUG ASSAY ACETAMINOPHEN: CPT | Mod: ER | Performed by: EMERGENCY MEDICINE

## 2024-07-03 PROCEDURE — G0425 INPT/ED TELECONSULT30: HCPCS | Mod: 95,,, | Performed by: PSYCHIATRY & NEUROLOGY

## 2024-07-03 PROCEDURE — 81025 URINE PREGNANCY TEST: CPT | Mod: ER | Performed by: EMERGENCY MEDICINE

## 2024-07-03 PROCEDURE — 99285 EMERGENCY DEPT VISIT HI MDM: CPT | Mod: 25,ER

## 2024-07-03 RX ORDER — LORAZEPAM 2 MG/ML
1 INJECTION INTRAMUSCULAR
Status: DISCONTINUED | OUTPATIENT
Start: 2024-07-03 | End: 2024-07-03 | Stop reason: HOSPADM

## 2024-07-03 RX ADMIN — SODIUM CHLORIDE 1000 ML: 9 INJECTION, SOLUTION INTRAVENOUS at 12:07
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
## 2024-07-03 NOTE — ED NOTES
Pt belongings are as follows:    [place] tea  Black slip on shoes  Grey pants  Grey shirt  Phone  Earphones    Black socjks  Wallet  SNAP card  Id (2)  Misc. Debit/ credit cards  Insurance cards  Misc. Papers  $66 [(3)$20 bills, (1) $5 bill, (1) $1 bill] witnessed and verified by CRISTHIAN Tello    Pt changed into facility approved scrubs and wanded by [employer] officer. Pt belongings were itemized, labeled, and secured in PEC cabinet by NICCI Sue
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
## 2025-02-20 ENCOUNTER — LAB VISIT (OUTPATIENT)
Dept: LAB | Facility: HOSPITAL | Age: 35
End: 2025-02-20
Attending: FAMILY MEDICINE
Payer: MEDICAID

## 2025-02-20 ENCOUNTER — OFFICE VISIT (OUTPATIENT)
Dept: PRIMARY CARE CLINIC | Facility: CLINIC | Age: 35
End: 2025-02-20
Payer: MEDICAID

## 2025-02-20 ENCOUNTER — E-CONSULT (OUTPATIENT)
Dept: GASTROENTEROLOGY | Facility: CLINIC | Age: 35
End: 2025-02-20
Payer: MEDICAID

## 2025-02-20 VITALS
WEIGHT: 242.63 LBS | DIASTOLIC BLOOD PRESSURE: 88 MMHG | BODY MASS INDEX: 39 KG/M2 | SYSTOLIC BLOOD PRESSURE: 116 MMHG | HEIGHT: 66 IN | HEART RATE: 83 BPM | OXYGEN SATURATION: 98 % | TEMPERATURE: 98 F

## 2025-02-20 DIAGNOSIS — F25.9 SCHIZO-AFFECTIVE SCHIZOPHRENIA: ICD-10-CM

## 2025-02-20 DIAGNOSIS — G89.29 CHRONIC BILATERAL BACK PAIN, UNSPECIFIED BACK LOCATION: ICD-10-CM

## 2025-02-20 DIAGNOSIS — Z79.899 ENCOUNTER FOR LONG-TERM CURRENT USE OF MEDICATION: ICD-10-CM

## 2025-02-20 DIAGNOSIS — Z80.0 FAMILY HISTORY OF COLON CANCER IN MOTHER: ICD-10-CM

## 2025-02-20 DIAGNOSIS — F41.9 RECURRENT MODERATE MAJOR DEPRESSIVE DISORDER WITH ANXIETY: ICD-10-CM

## 2025-02-20 DIAGNOSIS — F33.1 RECURRENT MODERATE MAJOR DEPRESSIVE DISORDER WITH ANXIETY: ICD-10-CM

## 2025-02-20 DIAGNOSIS — M54.9 CHRONIC BILATERAL BACK PAIN, UNSPECIFIED BACK LOCATION: ICD-10-CM

## 2025-02-20 DIAGNOSIS — E66.812 CLASS 2 OBESITY WITHOUT SERIOUS COMORBIDITY WITH BODY MASS INDEX (BMI) OF 39.0 TO 39.9 IN ADULT, UNSPECIFIED OBESITY TYPE: Chronic | ICD-10-CM

## 2025-02-20 DIAGNOSIS — Z01.419 WELL WOMAN EXAM: ICD-10-CM

## 2025-02-20 DIAGNOSIS — R11.0 INTRACTABLE NAUSEA: ICD-10-CM

## 2025-02-20 DIAGNOSIS — Z12.11 COLON CANCER SCREENING: Primary | ICD-10-CM

## 2025-02-20 DIAGNOSIS — Z00.01 ENCOUNTER FOR GENERAL ADULT MEDICAL EXAMINATION WITH ABNORMAL FINDINGS: Primary | ICD-10-CM

## 2025-02-20 LAB
ALBUMIN SERPL BCP-MCNC: 3.7 G/DL (ref 3.5–5.2)
ALP SERPL-CCNC: 62 U/L (ref 40–150)
ALT SERPL W/O P-5'-P-CCNC: 18 U/L (ref 10–44)
ANION GAP SERPL CALC-SCNC: 10 MMOL/L (ref 8–16)
AST SERPL-CCNC: 34 U/L (ref 10–40)
BILIRUB SERPL-MCNC: 0.3 MG/DL (ref 0.1–1)
BUN SERPL-MCNC: 8 MG/DL (ref 6–20)
CALCIUM SERPL-MCNC: 9.1 MG/DL (ref 8.7–10.5)
CHLORIDE SERPL-SCNC: 103 MMOL/L (ref 95–110)
CO2 SERPL-SCNC: 24 MMOL/L (ref 23–29)
CREAT SERPL-MCNC: 0.8 MG/DL (ref 0.5–1.4)
EST. GFR  (NO RACE VARIABLE): >60 ML/MIN/1.73 M^2
ESTIMATED AVG GLUCOSE: 128 MG/DL (ref 68–131)
GLUCOSE SERPL-MCNC: 111 MG/DL (ref 70–110)
HBA1C MFR BLD: 6.1 % (ref 4–5.6)
POTASSIUM SERPL-SCNC: 4.4 MMOL/L (ref 3.5–5.1)
PROT SERPL-MCNC: 7.7 G/DL (ref 6–8.4)
SODIUM SERPL-SCNC: 137 MMOL/L (ref 136–145)

## 2025-02-20 PROCEDURE — 36415 COLL VENOUS BLD VENIPUNCTURE: CPT | Mod: PN | Performed by: FAMILY MEDICINE

## 2025-02-20 PROCEDURE — 99215 OFFICE O/P EST HI 40 MIN: CPT | Mod: PBBFAC,PN | Performed by: FAMILY MEDICINE

## 2025-02-20 PROCEDURE — 85025 COMPLETE CBC W/AUTO DIFF WBC: CPT | Performed by: FAMILY MEDICINE

## 2025-02-20 PROCEDURE — 80053 COMPREHEN METABOLIC PANEL: CPT | Performed by: FAMILY MEDICINE

## 2025-02-20 PROCEDURE — 83036 HEMOGLOBIN GLYCOSYLATED A1C: CPT | Performed by: FAMILY MEDICINE

## 2025-02-20 RX ORDER — PROPRANOLOL HYDROCHLORIDE 10 MG/1
TABLET ORAL
COMMUNITY
Start: 2024-07-13

## 2025-02-20 RX ORDER — OXCARBAZEPINE 300 MG/1
TABLET, FILM COATED ORAL
COMMUNITY
Start: 2024-07-13

## 2025-02-20 RX ORDER — ONDANSETRON 4 MG/1
4 TABLET, ORALLY DISINTEGRATING ORAL DAILY PRN
Qty: 30 TABLET | Refills: 2 | Status: SHIPPED | OUTPATIENT
Start: 2025-02-20

## 2025-02-20 RX ORDER — TRAZODONE HYDROCHLORIDE 100 MG/1
TABLET ORAL
COMMUNITY
Start: 2024-07-13

## 2025-02-20 RX ORDER — HYDROXYZINE PAMOATE 50 MG/1
CAPSULE ORAL
COMMUNITY
Start: 2024-07-13

## 2025-02-20 RX ORDER — FLUOXETINE HYDROCHLORIDE 20 MG/1
CAPSULE ORAL
COMMUNITY
Start: 2024-07-13

## 2025-02-20 RX ORDER — PALIPERIDONE 6 MG/1
12 TABLET, EXTENDED RELEASE ORAL
COMMUNITY
Start: 2024-09-17 | End: 2025-02-20 | Stop reason: ALTCHOICE

## 2025-02-20 RX ORDER — PALIPERIDONE PALMITATE 234 MG/1.5ML
INJECTION INTRAMUSCULAR
COMMUNITY
Start: 2025-01-07

## 2025-02-20 RX ORDER — MIRTAZAPINE 15 MG/1
TABLET, FILM COATED ORAL
COMMUNITY
Start: 2024-07-13

## 2025-02-20 RX ORDER — OXCARBAZEPINE 150 MG/1
TABLET, FILM COATED ORAL
COMMUNITY
Start: 2024-07-13

## 2025-02-20 NOTE — PROGRESS NOTES
Subjective:      Chief Complaint   Patient presents with    [facility]     Nausea and back pain (all over 1 yr)    Annual Exam      Patient ID: Nimo Mahan is a 34 y.o. female.  History of Present Illness    Came in with mother who contributed to the history.  Here for annual and discuss psych referral and nausea and obesity and back pain  April presents today for follow up after graduating from [facility] (Intensive Outpatient Program)    PSYCHIATRIC:  She receives Invega injections for psychiatric management. She reports side effects including significant weight gain.  Complaint of nausea and overall back pain when doing house chores requiring her to stand up like dishes  Obesity-mother asking for help  MUSCULOSKELETAL:  She reports chronic back pain affecting the entire back for at least one year. Pain is exacerbated by activities such as bending forward while washing dishes, requiring rest periods between activities.    MEDICAL HISTORY:  She has history of stage I appendix cancer diagnosed at age 39.      ROS:  General: -fever, -chills, -fatigue, +weight gain, -weight loss  Eyes: -vision changes, -redness, -discharge  ENT: -ear pain, -nasal congestion, -sore throat  Cardiovascular: -chest pain, -palpitations, -lower extremity edema  Respiratory: -cough, -shortness of breath  Gastrointestinal: -abdominal pain, +nausea, -vomiting, -diarrhea, -constipation, -blood in stool  Genitourinary: -dysuria, -hematuria, -frequency  Musculoskeletal: -joint pain, -muscle pain, +back pain  Skin: -rash, -lesion  Neurological: -headache, -dizziness, -numbness, -tingling  Psychiatric: -anxiety, -depression, -sleep difficulty       Nimo Lloyds allergies, medications, history, and problem list were updated as appropriate.  Past Medical History:   Diagnosis Date    Anxiety     Bipolar disorder     Chronic mental illness     Depression     Pulmonary embolus     Schizophrenia, schizo-affective type, depressed      Family  "History   Problem Relation Name Age of Onset    Hyperlipidemia Mother      Hyperlipidemia Father      Kidney disease Father       Social History[1]  Encounter Medications[2]       Objective:      /88   Pulse 83   Temp 97.9 °F (36.6 °C)   Ht 5' 6" (1.676 m)   Wt 110 kg (242 lb 9.6 oz)   SpO2 98%   BMI 39.16 kg/m²   Physical Exam  Vitals and nursing note reviewed.   Constitutional:       General: She is not in acute distress.  HENT:      Head: Normocephalic and atraumatic.   Cardiovascular:      Rate and Rhythm: Normal rate and regular rhythm.      Pulses: Normal pulses.      Heart sounds: No murmur heard.  Pulmonary:      Effort: Pulmonary effort is normal. No respiratory distress.      Breath sounds: Normal breath sounds. No wheezing or rhonchi.   Musculoskeletal:         General: No swelling or deformity.        Arms:       Thoracic back: Spasms and tenderness (paraspinal) present. Normal range of motion.      Lumbar back: Tenderness present. No spasms. Normal range of motion. Negative right straight leg raise test and negative left straight leg raise test.        Back:       Right lower leg: No edema.      Left lower leg: No edema.   Neurological:      General: No focal deficit present.      Mental Status: She is alert.      Cranial Nerves: No cranial nerve deficit.   Psychiatric:         Mood and Affect: Mood is anxious. Affect is blunt.         Behavior: Behavior normal. Behavior is not agitated. Behavior is cooperative.         Thought Content: Thought content normal. Thought content does not include suicidal ideation. Thought content does not include homicidal or suicidal plan.                   Results for orders placed or performed during the hospital encounter of 07/03/24   EKG 12-lead    Collection Time: 07/03/24 12:08 PM   Result Value Ref Range    QRS Duration 62 ms    [facility] QTC Calculation 450 ms   CBC auto differential    Collection Time: 07/03/24 12:09 PM   Result Value Ref Range    WBC " 12.04 3.90 - 12.70 K/uL    RBC 4.65 4.00 - 5.40 M/uL    Hemoglobin 12.9 12.0 - 16.0 g/dL    Hematocrit 38.3 37.0 - 48.5 %    MCV 82 82 - 98 fL    MCH 27.7 27.0 - 31.0 pg    MCHC 33.7 32.0 - 36.0 g/dL    RDW 14.5 11.5 - 14.5 %    Platelets 413 150 - 450 K/uL    MPV 10.3 9.2 - 12.9 fL    Immature Granulocytes 0.2 0.0 - 0.5 %    Gran # (ANC) 6.3 1.8 - 7.7 K/uL    Immature Grans (Abs) 0.03 0.00 - 0.04 K/uL    Lymph # 5.1 (H) 1.0 - 4.8 K/uL    Mono # 0.5 0.3 - 1.0 K/uL    Eos # 0.1 0.0 - 0.5 K/uL    Baso # 0.03 0.00 - 0.20 K/uL    nRBC 0 0 /100 WBC    Gran % 52.1 38.0 - 73.0 %    Lymph % 42.4 18.0 - 48.0 %    Mono % 4.4 4.0 - 15.0 %    Eosinophil % 0.7 0.0 - 8.0 %    Basophil % 0.2 0.0 - 1.9 %    Differential Method Automated    Comprehensive metabolic panel    Collection Time: 07/03/24 12:09 PM   Result Value Ref Range    Sodium 138 136 - 145 mmol/L    Potassium 3.4 (L) 3.5 - 5.1 mmol/L    Chloride 106 95 - 110 mmol/L    CO2 21 (L) 23 - 29 mmol/L    Glucose 99 70 - 110 mg/dL    BUN 17 6 - 20 mg/dL    Creatinine 0.9 0.5 - 1.4 mg/dL    Calcium 10.0 8.7 - 10.5 mg/dL    Total Protein 7.7 6.0 - 8.4 g/dL    Albumin 3.7 3.5 - 5.2 g/dL    Total Bilirubin 0.2 0.1 - 1.0 mg/dL    Alkaline Phosphatase 68 55 - 135 U/L    AST 18 10 - 40 U/L    ALT 19 10 - 44 U/L    eGFR >60.0 >60 mL/min/1.73 m^2    Anion Gap 11 8 - 16 mmol/L   TSH    Collection Time: 07/03/24 12:09 PM   Result Value Ref Range    TSH 2.782 0.400 - 4.000 uIU/mL   Urinalysis, Reflex to Urine Culture Urine, Clean Catch    Collection Time: 07/03/24 12:09 PM    Specimen: Urine   Result Value Ref Range    Specimen UA Urine, Clean Catch     Color, UA Yellow Yellow, Straw, Sarah    Appearance, UA Clear Clear    pH, UA 6.0 5.0 - 8.0    Specific Gravity, UA 1.015 1.005 - 1.030    Protein, UA Negative Negative    Glucose, UA Negative Negative    Ketones, UA Negative Negative    Bilirubin (UA) Negative Negative    Occult Blood UA 1+ (A) Negative    Nitrite, UA Negative Negative     Urobilinogen, UA Negative <2.0 EU/dL    Leukocytes, UA Negative Negative   Ethanol    Collection Time: 07/03/24 12:09 PM   Result Value Ref Range    Alcohol, Serum <10 <10 mg/dL   Acetaminophen level    Collection Time: 07/03/24 12:09 PM   Result Value Ref Range    Acetaminophen (Tylenol), Serum <3.0 (L) 10.0 - 20.0 ug/mL   Salicylate level    Collection Time: 07/03/24 12:09 PM   Result Value Ref Range    Salicylate Lvl <5.0 (L) 15.0 - 30.0 mg/dL   Urinalysis Microscopic    Collection Time: 07/03/24 12:09 PM   Result Value Ref Range    RBC, UA 0 0 - 4 /hpf    Microscopic Comment SEE COMMENT    Drug screen panel, emergency    Collection Time: 07/03/24 12:10 PM   Result Value Ref Range    Benzodiazepines Negative Negative    Methadone metabolites Negative Negative    Cocaine (Metab.) Negative Negative    Opiate Scrn, Ur Negative Negative    Barbiturate Screen, Ur Negative Negative    Amphetamine Screen, Ur Negative Negative    THC Negative Negative    Phencyclidine Negative Negative    Creatinine, Urine 107.7 15.0 - 325.0 mg/dL    Toxicology Information SEE COMMENT    Pregnancy, urine rapid    Collection Time: 07/03/24 12:10 PM   Result Value Ref Range    Preg Test, Ur Negative      Assessment/Plan:         1. Encounter for general adult medical examination with abnormal findings    2. Schizo-affective schizophrenia    3. Recurrent moderate major depressive disorder with anxiety    4. Intractable nausea    5. Encounter for long-term current use of medication    6. Family history of colon cancer in mother    7. Chronic bilateral back pain, unspecified back location    8. Well woman exam    9. Class 2 obesity without serious comorbidity with body mass index (BMI) of 39.0 to 39.9 in adult, unspecified obesity type    10. BMI 39.0-39.9,adult      Encounter for general adult medical examination with abnormal findings  Comments:  Back pain    Schizo-affective schizophrenia  -     Ambulatory referral/consult to  Psychiatry; Future; Expected date: 02/27/2025    Recurrent moderate major depressive disorder with anxiety    Intractable nausea  -     ondansetron (ZOFRAN-ODT) 4 MG TbDL; Take 1 tablet (4 mg total) by mouth daily as needed (nausea).  Dispense: 30 tablet; Refill: 2    Encounter for long-term current use of medication  -     Hemoglobin A1C; Future; Expected date: 02/20/2025  -     CBC Auto Differential; Future; Expected date: 02/20/2025  -     Comprehensive Metabolic Panel; Future; Expected date: 02/20/2025  -     Insulin, random; Future; Expected date: 02/20/2025    Family history of colon cancer in mother  -     E-Consult to Gastroenterology    Chronic bilateral back pain, unspecified back location  -     X-Ray Thoracolumbar Spine AP Lateral; Future; Expected date: 02/20/2025  -     Ambulatory referral/consult to Physical/Occupational Therapy; Future; Expected date: 02/27/2025    Well woman exam  -     Ambulatory referral/consult to Gynecology; Future; Expected date: 02/27/2025    Class 2 obesity without serious comorbidity with body mass index (BMI) of 39.0 to 39.9 in adult, unspecified obesity type  Comments:  Lifestyle modification and wellness advised. She will start walking with her mother    BMI 39.0-39.9,adult      SCHIZOPHRENIA:  - Continued the patient's antipsychotic medication in the form of Invega injections for schizophrenia treatment.  - Advised continuation of most psychiatric medications.  - Evaluated the patient's transition from [facility] program and need for continued psychiatric care.  - Referred the patient to 2nd floor behavioral health for continued psychiatric care and Invega injections.  - Instructed the patient to collect referral form from behavioral health department after current visit.    WEIGHT MANAGEMENT:  - Evaluated weight gain, potentially related to psychiatric medications.  - Recommend diet modification to manage weight gain.  - Recommend improving diet to manage weight gain.  - April  to start walking for exercise with mother.    PREDIABETES SCREENING:  - Assessed need for diabetes screening due to weight gain and medication side effects.  - Educated the patient on weight gain as a side effect of antipsychotic medications and explained potential risk of diabetes.  - Ordered labs including A1C to check for prediabetes.  - Considered starting metformin pending A1C results to manage weight gain and prevent diabetes.    BACK PAIN:  - Evaluated back pain, potentially related to weight gain from psychiatric medications.  - April reports back pain when bending down, affecting daily activities like washing dishes, ongoing for at least a year.  - Ordered x-ray of back to evaluate pain.  - Referred the patient to [facility], [place], operated by [facility] for back pain management.  - - Advised stretching exercises to loosen up muscles.  -NAUSEA:  - Discussed nausea as a common side effect of psychiatric medications.  - April reports experiencing nausea for a year.  - Considered post-nasal drip as a potential cause of nausea.  - Recommend taking anti-nausea medication only as needed to avoid constipation.    COLON CANCER SCREENING:  - Considered family history of colon cancer for early screening.  - Noted that mother reports having colon cancer at 40 yrs  - Referred the patient to gastroenterology for colon cancer screening.    FOLLOW UP:  - Scheduled follow up in 3 months.            I have reviewed all of the patient's clinical history available in care everywhere and Epic and have utilized this in my evaluation and management recommendations today.      Treatment options and alternatives were discussed with the patient. Patient was given ample time to ask questions. All questions were answered. Voices understanding and acceptance of this advice. Will call back if any further questions or concerns.             This note was generated with the assistance of ambient listening technology. Verbal consent was obtained by the  patient and accompanying visitor(s) for the recording of patient appointment to facilitate this note. I attest to having reviewed and edited the generated note for accuracy, though some syntax or spelling errors may persist. Please contact the author of this note for any clarification.            Debbie Kimble MD  [facility] [place]             [1]  Social History  Socioeconomic History    Marital status: Single   Tobacco Use    Smoking status: Every Day     Current packs/day: 0.50     Types: Cigarettes     Passive exposure: Never    Smokeless tobacco: Never   Substance and Sexual Activity    Alcohol use: Not Currently    Drug use: Not Currently     Types: Marijuana    Sexual activity: Yes     Partners: Male     Social Drivers of Health     Financial Resource Strain: High Risk (2/13/2025)    Overall Financial Resource Strain (CARDIA)     Difficulty of Paying Living Expenses: Very hard   Food Insecurity: Food Insecurity Present (2/13/2025)    Hunger Vital Sign     Worried About Running Out of Food in the Last Year: Sometimes true     Ran Out of Food in the Last Year: Never true   Transportation Needs: Unmet Transportation Needs (2/13/2025)    PRAPARE - Transportation     Lack of Transportation (Medical): No     Lack of Transportation (Non-Medical): Yes   Physical Activity: Insufficiently Active (2/13/2025)    Exercise Vital Sign     Days of Exercise per Week: 2 days     Minutes of Exercise per Session: 10 min   Stress: Stress Concern Present (2/13/2025)    [employer] - Occupational Stress Questionnaire     Feeling of Stress : To some extent   Housing Stability: High Risk (2/13/2025)    Housing Stability Vital Sign     Unable to Pay for Housing in the Last Year: Yes     Number of Times Moved in the Last Year: 2     Homeless in the Last Year: Yes   [2]  Outpatient Encounter Medications as of 2/20/2025   Medication Sig Dispense Refill    FLUoxetine  20 MG capsule       hydrOXYzine pamoate (VISTARIL) 50 MG Cap       INVEGA SUSTENNA 234 mg/1.5 mL Syrg injection SMARTSI Milligram(s) IM Every 4 Weeks      mirtazapine (REMERON) 15 MG tablet       OXcarbazepine (TRILEPTAL) 150 MG Tab       OXcarbazepine (TRILEPTAL) 300 MG Tab       propranoloL (INDERAL) 10 MG tablet       traZODone (DESYREL) 100 MG tablet       [DISCONTINUED] paliperidone (INVEGA) 6 MG TR24 Take 12 mg by mouth.      ALPRAZolam (XANAX) 0.5 MG tablet Take 0.5 mg by mouth 2 (two) times daily as needed.  (Patient not taking: Reported on 2025)      escitalopram oxalate (LEXAPRO) 20 MG tablet Take 20 mg by mouth once daily. (Patient not taking: Reported on 2025)      haloperidoL (HALDOL) 10 MG tablet Take 1 tablet (10 mg total) by mouth every evening. (Patient not taking: Reported on 2025) 30 tablet 0    ondansetron (ZOFRAN-ODT) 4 MG TbDL Take 1 tablet (4 mg total) by mouth daily as needed (nausea). 30 tablet 2    risperiDONE (RISPERDAL) 2 MG tablet Take 1 tablet (2 mg total) by mouth 2 (two) times daily. (Patient not taking: Reported on 2025) 60 tablet 0    sertraline (ZOLOFT) 100 MG tablet Take 1 tablet (100 mg total) by mouth once daily. (Patient not taking: Reported on 2025) 30 tablet 0     No facility-administered encounter medications on file as of 2025.

## 2025-02-21 DIAGNOSIS — Z12.11 ENCOUNTER FOR SCREENING COLONOSCOPY: Primary | ICD-10-CM

## 2025-02-21 LAB
BASOPHILS # BLD AUTO: 0.03 K/UL (ref 0–0.2)
BASOPHILS NFR BLD: 0.4 % (ref 0–1.9)
DIFFERENTIAL METHOD BLD: ABNORMAL
EOSINOPHIL # BLD AUTO: 0.1 K/UL (ref 0–0.5)
EOSINOPHIL NFR BLD: 1.3 % (ref 0–8)
ERYTHROCYTE [DISTWIDTH] IN BLOOD BY AUTOMATED COUNT: 13.4 % (ref 11.5–14.5)
HCT VFR BLD AUTO: 39.2 % (ref 37–48.5)
HGB BLD-MCNC: 12.2 G/DL (ref 12–16)
IMM GRANULOCYTES # BLD AUTO: 0.02 K/UL (ref 0–0.04)
IMM GRANULOCYTES NFR BLD AUTO: 0.3 % (ref 0–0.5)
LYMPHOCYTES # BLD AUTO: 2.5 K/UL (ref 1–4.8)
LYMPHOCYTES NFR BLD: 36.1 % (ref 18–48)
MCH RBC QN AUTO: 26.5 PG (ref 27–31)
MCHC RBC AUTO-ENTMCNC: 31.1 G/DL (ref 32–36)
MCV RBC AUTO: 85 FL (ref 82–98)
MONOCYTES # BLD AUTO: 0.4 K/UL (ref 0.3–1)
MONOCYTES NFR BLD: 5 % (ref 4–15)
NEUTROPHILS # BLD AUTO: 4 K/UL (ref 1.8–7.7)
NEUTROPHILS NFR BLD: 56.9 % (ref 38–73)
NRBC BLD-RTO: 0 /100 WBC
PLATELET # BLD AUTO: 360 K/UL (ref 150–450)
PMV BLD AUTO: 9.9 FL (ref 9.2–12.9)
RBC # BLD AUTO: 4.61 M/UL (ref 4–5.4)
WBC # BLD AUTO: 7.03 K/UL (ref 3.9–12.7)

## 2025-02-21 NOTE — CONSULTS
"[facility]  Response for E-Consult     Patient Name: Nimo Mahan  MRN: [id number]  Primary Care Provider: Patrick Munoz MD   Requesting Provider: Debbie Kimble MD  E-Consult to Gastroenterology  Consult performed by: Shavonne Short MD  Consult ordered by: Debbie Kimble MD        Recommendation: colonoscopy for CRC screening in patient with FH of CRC in 1st degree relative at 40 yo     Additional future steps to consider: N/A    Total time of Consultation: 5 minute    I did not speak to the requesting provider verbally about this.     *This eConsult is based on the clinical data available to me and is furnished without benefit of a physical examination. The eConsult will need to be interpreted in light of any clinical issues or changes in patient status not available to me at the time of filing this eConsults. Significant changes in patient condition or level of acuity should result in immediate formal consultation and reevaluation. Please alert me if you have further questions.    Thank you for this eConsult referral.     Procedure: Colonoscopy    Diagnosis: Screening colonoscopy    Procedure Timin-12 weeks    *If within 4 weeks selected, please letitia as high priority*    *If greater than 12 weeks, please select "5-12 weeks" and delay sending until 3 months prior to requested date*    Location: Any Site    Additional Scheduling Information: No scheduling concerns    Prep Specifications:Standard prep    Is the patient taking a GLP-1 Agonist:no    Have you attached a patient to this message: yes      Shavonne Short MD  [facility]      "

## 2025-02-23 ENCOUNTER — RESULTS FOLLOW-UP (OUTPATIENT)
Dept: PRIMARY CARE CLINIC | Facility: CLINIC | Age: 35
End: 2025-02-23

## 2025-02-23 DIAGNOSIS — Z12.11 COLON CANCER SCREENING: Primary | ICD-10-CM

## 2025-02-23 DIAGNOSIS — R73.03 PREDIABETES: Primary | ICD-10-CM

## 2025-02-23 DIAGNOSIS — Z80.0 FAMILY HISTORY OF COLON CANCER IN MOTHER: ICD-10-CM

## 2025-02-23 RX ORDER — METFORMIN HYDROCHLORIDE 500 MG/1
500 TABLET ORAL 2 TIMES DAILY WITH MEALS
Qty: 180 TABLET | Refills: 3 | Status: SHIPPED | OUTPATIENT
Start: 2025-02-23 | End: 2026-02-23

## 2025-02-23 NOTE — PROGRESS NOTES
I have reviewed all your lab results.     Your A1C is abnormal, and you have PREDIABETES and med has been sent to your pharmacy.   Start metformin I tab with dinner for 3 days and if tolerable, then one tab with breakfast and one tab with dinner.  Maintain/Continue a heathy lifestyle      If you have an appt for result review, please keep it, so we can discuss details and plan of care.

## 2025-02-25 ENCOUNTER — TELEPHONE (OUTPATIENT)
Dept: ENDOSCOPY | Facility: HOSPITAL | Age: 35
End: 2025-02-25
Payer: MEDICAID

## 2025-02-25 NOTE — TELEPHONE ENCOUNTER
Contacted the patient to schedule an endoscopy procedure(s) colonoscopy. Spoke with patient. Patient requesting to have colonoscopy done in [place] as this is closer to were she lives. Patient also has Pre admit virtual audio visit scheduled on 2/28/25 at 9:00 AM to schedule colonoscopy. Patient aware of this and will keep appointment to be contacted to schedule colonoscopy in [place]. Referral for [facility] endoscopy canceled.

## 2025-02-27 ENCOUNTER — HOSPITAL ENCOUNTER (OUTPATIENT)
Dept: RADIOLOGY | Facility: HOSPITAL | Age: 35
Discharge: HOME | End: 2025-02-27
Attending: FAMILY MEDICINE
Payer: MEDICAID

## 2025-02-27 DIAGNOSIS — G89.29 CHRONIC BILATERAL BACK PAIN, UNSPECIFIED BACK LOCATION: ICD-10-CM

## 2025-02-27 DIAGNOSIS — M54.9 CHRONIC BILATERAL BACK PAIN, UNSPECIFIED BACK LOCATION: ICD-10-CM

## 2025-02-27 PROCEDURE — 72080 X-RAY EXAM THORACOLMB 2/> VW: CPT | Mod: 26,,, | Performed by: STUDENT IN AN ORGANIZED HEALTH CARE EDUCATION/TRAINING PROGRAM

## 2025-02-27 PROCEDURE — 72080 X-RAY EXAM THORACOLMB 2/> VW: CPT | Mod: TC,PO

## 2025-02-28 ENCOUNTER — HOSPITAL ENCOUNTER (OUTPATIENT)
Dept: PREADMISSION TESTING | Facility: HOSPITAL | Age: 35
Discharge: HOME | End: 2025-02-28
Attending: FAMILY MEDICINE
Payer: MEDICAID

## 2025-02-28 DIAGNOSIS — Z80.0 FAMILY HISTORY OF COLON CANCER IN MOTHER: ICD-10-CM

## 2025-02-28 DIAGNOSIS — Z12.11 COLON CANCER SCREENING: Primary | ICD-10-CM

## 2025-02-28 RX ORDER — POLYETHYLENE GLYCOL 3350, SODIUM SULFATE ANHYDROUS, SODIUM BICARBONATE, SODIUM CHLORIDE, POTASSIUM CHLORIDE 236; 22.74; 6.74; 5.86; 2.97 G/4L; G/4L; G/4L; G/4L; G/4L
4 POWDER, FOR SOLUTION ORAL ONCE
Qty: 4000 ML | Refills: 0 | Status: SHIPPED | OUTPATIENT
Start: 2025-02-28 | End: 2025-02-28

## 2025-03-05 NOTE — TELEPHONE ENCOUNTER
Spoke with Annie from behavioral health and gave her a list of medication the pt stated she is not taking, Alprazolam, escitalopram. Haloperidol, risperidone, sertraline        ----- Message from Jil sent at 3/5/2025 10:51 AM CST -----  Contact: [phone] ext # [phone] Annie  [email or web address] Patient is calling for Medical Advice regarding:Annie Chawla called to see what Rx was received and disconnected for patient. Please call and advise How long has patient had these symptoms:Pharmacy name and phone#:Patient wants a call back or [facility]:Comments:Please advise patient replies from provider may take up to 48 hours.

## 2025-03-12 ENCOUNTER — ANESTHESIA EVENT (OUTPATIENT)
Dept: ENDOSCOPY | Facility: HOSPITAL | Age: 35
End: 2025-03-12
Payer: MEDICAID

## 2025-03-12 NOTE — ANESTHESIA PREPROCEDURE EVALUATION
03/12/2025  Nimo Mahan is a 34 y.o., female.      Pre-op Assessment    I have reviewed the Patient Summary Reports.     I have reviewed the Nursing Notes. I have reviewed the NPO Status.   I have reviewed the Medications.     Review of Systems  Anesthesia Hx:  No problems with previous Anesthesia             Denies Family Hx of Anesthesia complications.    Denies Personal Hx of Anesthesia complications.                    Social:  Smoker, No Alcohol Use, Recreational Drugs Marijuana       Hematology/Oncology:                   Hematology Comments: Sickle cell trait                     Cardiovascular:                    H/o PE 2018                           Hepatic/GI:  Bowel Prep.                   Endocrine:        Morbid Obesity / BMI > 40  Psych:  Psychiatric History  depression Bipolar, schizophrenia                Physical Exam  General: Well nourished, Cooperative, Alert and Oriented    Airway:  Mallampati: II   Mouth Opening: Normal  TM Distance: Normal  Tongue: Normal  Neck ROM: Normal ROM    Dental:        Anesthesia Plan  Type of Anesthesia, risks & benefits discussed:    Anesthesia Type: Gen Natural Airway  Intra-op Monitoring Plan: Standard ASA Monitors  Post Op Pain Control Plan: multimodal analgesia  Induction:  IV  Informed Consent: Informed consent signed with the Patient and all parties understand the risks and agree with anesthesia plan.  All questions answered. Patient consented to blood products? No  ASA Score: 2  Day of Surgery Review of History & Physical: H&P Update referred to the surgeon/provider.    Ready For Surgery From Anesthesia Perspective.     .

## 2025-03-14 ENCOUNTER — ANESTHESIA (OUTPATIENT)
Dept: ENDOSCOPY | Facility: HOSPITAL | Age: 35
End: 2025-03-14
Payer: MEDICAID

## 2025-03-14 ENCOUNTER — HOSPITAL ENCOUNTER (OUTPATIENT)
Dept: ENDOSCOPY | Facility: HOSPITAL | Age: 35
Discharge: HOME | End: 2025-03-14
Attending: FAMILY MEDICINE
Payer: MEDICAID

## 2025-03-14 VITALS
RESPIRATION RATE: 19 BRPM | SYSTOLIC BLOOD PRESSURE: 108 MMHG | DIASTOLIC BLOOD PRESSURE: 70 MMHG | OXYGEN SATURATION: 100 % | HEART RATE: 84 BPM | TEMPERATURE: 98 F

## 2025-03-14 DIAGNOSIS — Z80.0 FAMILY HISTORY OF COLON CANCER IN MOTHER: ICD-10-CM

## 2025-03-14 DIAGNOSIS — Z12.11 COLON CANCER SCREENING: ICD-10-CM

## 2025-03-14 LAB
B-HCG UR QL: NEGATIVE
CTP QC/QA: YES

## 2025-03-14 PROCEDURE — 63600175 PHARM REV CODE 636 W HCPCS: Performed by: NURSE ANESTHETIST, CERTIFIED REGISTERED

## 2025-03-14 PROCEDURE — 45380 COLONOSCOPY AND BIOPSY: CPT | Performed by: INTERNAL MEDICINE

## 2025-03-14 PROCEDURE — 45380 COLONOSCOPY AND BIOPSY: CPT | Mod: ,,, | Performed by: INTERNAL MEDICINE

## 2025-03-14 PROCEDURE — 27201012 HC FORCEPS, HOT/COLD, DISP

## 2025-03-14 PROCEDURE — 88305 TISSUE EXAM BY PATHOLOGIST: CPT | Performed by: PATHOLOGY

## 2025-03-14 PROCEDURE — 88305 TISSUE EXAM BY PATHOLOGIST: CPT | Mod: 26,,, | Performed by: PATHOLOGY

## 2025-03-14 PROCEDURE — 37000008 HC ANESTHESIA 1ST 15 MINUTES

## 2025-03-14 PROCEDURE — 81025 URINE PREGNANCY TEST: CPT | Performed by: INTERNAL MEDICINE

## 2025-03-14 PROCEDURE — 37000009 HC ANESTHESIA EA ADD 15 MINS

## 2025-03-14 RX ORDER — LIDOCAINE HYDROCHLORIDE 20 MG/ML
INJECTION INTRAVENOUS
Status: DISCONTINUED | OUTPATIENT
Start: 2025-03-14 | End: 2025-03-14

## 2025-03-14 RX ORDER — PROPOFOL 10 MG/ML
VIAL (ML) INTRAVENOUS
Status: DISCONTINUED | OUTPATIENT
Start: 2025-03-14 | End: 2025-03-14

## 2025-03-14 RX ADMIN — LIDOCAINE HYDROCHLORIDE 40 MG: 20 INJECTION INTRAVENOUS at 10:03

## 2025-03-14 RX ADMIN — PROPOFOL 80 MG: 10 INJECTION, EMULSION INTRAVENOUS at 10:03

## 2025-03-14 RX ADMIN — PROPOFOL 100 MG: 10 INJECTION, EMULSION INTRAVENOUS at 10:03

## 2025-03-14 RX ADMIN — PROPOFOL 30 MG: 10 INJECTION, EMULSION INTRAVENOUS at 10:03

## 2025-03-14 RX ADMIN — SODIUM CHLORIDE, POTASSIUM CHLORIDE, SODIUM LACTATE AND CALCIUM CHLORIDE: 600; 310; 30; 20 INJECTION, SOLUTION INTRAVENOUS at 09:03

## 2025-03-14 NOTE — ANESTHESIA POSTPROCEDURE EVALUATION
Anesthesia Post Evaluation    Patient: April Negrito    Procedure(s) Performed: * No procedures listed *    Final Anesthesia Type: general      Patient location during evaluation: PACU  Patient participation: Yes- Able to Participate  Level of consciousness: awake  Post-procedure vital signs: reviewed and stable  Pain management: adequate  Airway patency: patent    PONV status at discharge: No PONV  Anesthetic complications: no      Cardiovascular status: stable  Respiratory status: unassisted  Hydration status: euvolemic  Follow-up not needed.              Vitals Value Taken Time   /70 03/14/25 11:00        Pulse 84 03/14/25 11:00   Resp 19 03/14/25 11:00   SpO2 100 % 03/14/25 11:00         No case tracking events are documented in the log.      Pain/Dianne Score: Dianne Score: 10 (3/14/2025 11:00 AM)

## 2025-03-14 NOTE — H&P
Endoscopy History and Physical    PCP - Patrick Munoz MD  Referring Physician - Debbie Kimble MD  [address] [facility]  [address]  [place] [address]      ASA - per anesthesia  Mallampati - per anesthesia  History of Anesthesia problems - no  Family history Anesthesia problems -  no   Plan of anesthesia - General    HPI  34 y.o. female    Planned Procedure: Colonoscopy  Diagnosis: family history of colon cancer  Chief Complaint: Same as above    Personnel H/o colon polyps:index  FH of colon cancer:no  Anticoagulation:no      ROS:  Constitutional: No fevers, chills, No weight loss  CV: No chest pain  Pulm: No cough, No shortness of breath  GI: see HPI    Medical History:  has a past medical history of Anxiety, Bipolar disorder, Chronic mental illness, Depression, Pulmonary embolus, and Schizophrenia, schizo-affective type, depressed.    Surgical History:  has a past surgical history that includes  section.    Family History: family history includes Hyperlipidemia in her father and mother; Kidney disease in her father..    Social History:  reports that she has been smoking cigarettes. She has never been exposed to tobacco smoke. She has never used smokeless tobacco. She reports that she does not currently use alcohol. She reports that she does not currently use drugs after having used the following drugs: Marijuana.    Review of patient's allergies indicates:   Allergen Reactions    Sulfamethoxazole-trimethoprim      Patient says she is Immune to it       Medications:   Prescriptions Prior to Admission[1]    Physical Exam:    Vital Signs: There were no vitals filed for this visit.    General Appearance: Well appearing in no acute distress  Abdomen: Soft, non tender, non distended with normal bowel sounds, no masses    Labs:  Lab Results   Component Value Date    WBC 7.03 2025    HGB 12.2 2025    HCT 39.2 2025     2025    ALT 18 2025    AST 34 2025      02/20/2025    K 4.4 02/20/2025     02/20/2025    CREATININE 0.8 02/20/2025    BUN 8 02/20/2025    CO2 24 02/20/2025    TSH 2.782 07/03/2024    INR 1.2 12/19/2018    HGBA1C 6.1 (H) 02/20/2025       I have explained the risks and benefits of this endoscopic procedure to the patient including but not limited to bleeding, inflammation, infection, perforation, and death.    SEDATION PLAN: per anesthesia       History reviewed, vital signs satisfactory, cardiopulmonary status satisfactory, sedation options, risks and plans have been discussed with the patient  All their questions were answered and the patient agrees to the sedation procedures as planned and the patient is deemed an appropriate candidate for the sedation as planned.     The risks, benefits and alternatives of the procedure were discussed with the patient in detail. This discussion was had in the presence of endoscopy staff. The risks include, risks of adverse reaction to sedation requiring the use of reversal agents, bleeding requiring blood transfusion, perforation requiring surgical intervention and technical failure. Other risks include aspiration leading to respiratory distress and respiratory failure resulting in endotracheal intubation and mechanical ventilation including death. If anesthesia is being utilized for this procedure, it is up to the anesthesiologist to determine airway safety including elective endotracheal intubation. Questions were answered, they agree to proceed. There was no language barriers.       Procedure explained to patient, informed consent obtained and placed in chart.       Jerald Dover MD       [1] (Not in a hospital admission)

## 2025-03-14 NOTE — DISCHARGE SUMMARY
[facility] - Endoscopy 1st Fl  Discharge Note  Short Stay    Colonoscopy      OUTCOME: Patient tolerated treatment/procedure well without complication and is now ready for discharge.    DISPOSITION: Home or Self Care    FINAL DIAGNOSIS:  <principal problem not specified>    FOLLOWUP: With primary care provider    DISCHARGE INSTRUCTIONS:  No discharge procedures on file.      Clinical Reference Documents Added to Patient Instructions         Document    COLON POLYPS (ENGLISH)            TIME SPENT ON DISCHARGE: 20 minutes

## 2025-03-14 NOTE — TRANSFER OF CARE
Anesthesia Transfer of Care Note    Patient: April Mahan    Procedure(s) Performed: * No procedures listed *    Patient location: PACU    Anesthesia Type: general    Transport from OR: Transported from OR on room air with adequate spontaneous ventilation    Post pain: adequate analgesia    Post assessment: no apparent anesthetic complications    Post vital signs: stable    Level of consciousness: awake    Nausea/Vomiting: no nausea/vomiting    Complications: none    Transfer of care protocol was followed      Last vitals: Visit Vitals  /64 (BP Location: Left arm, Patient Position: Lying)   Pulse 83   Temp 36.4 °C (97.5 °F)   Resp 15   SpO2 100%   Breastfeeding No

## 2025-03-17 ENCOUNTER — RESULTS FOLLOW-UP (OUTPATIENT)
Dept: HEPATOLOGY | Facility: HOSPITAL | Age: 35
End: 2025-03-17

## 2025-03-17 VITALS
RESPIRATION RATE: 19 BRPM | HEART RATE: 84 BPM | OXYGEN SATURATION: 100 % | DIASTOLIC BLOOD PRESSURE: 70 MMHG | SYSTOLIC BLOOD PRESSURE: 108 MMHG | TEMPERATURE: 98 F

## 2025-03-17 LAB
FINAL PATHOLOGIC DIAGNOSIS: NORMAL
GROSS: NORMAL
Lab: NORMAL

## 2025-03-18 ENCOUNTER — PATIENT MESSAGE (OUTPATIENT)
Dept: GASTROENTEROLOGY | Facility: CLINIC | Age: 35
End: 2025-03-18
Payer: MEDICAID

## 2025-06-16 ENCOUNTER — HOSPITAL ENCOUNTER (OUTPATIENT)
Dept: RADIOLOGY | Facility: HOSPITAL | Age: 35
Discharge: HOME | End: 2025-06-16
Attending: NURSE PRACTITIONER
Payer: MEDICAID

## 2025-06-16 VITALS — WEIGHT: 242.5 LBS | BODY MASS INDEX: 38.97 KG/M2 | HEIGHT: 66 IN

## 2025-06-16 DIAGNOSIS — N64.4 MASTODYNIA: ICD-10-CM

## 2025-06-16 PROCEDURE — 77062 BREAST TOMOSYNTHESIS BI: CPT | Mod: TC

## 2025-06-16 PROCEDURE — 77066 DX MAMMO INCL CAD BI: CPT | Mod: 26,,, | Performed by: STUDENT IN AN ORGANIZED HEALTH CARE EDUCATION/TRAINING PROGRAM

## 2025-06-16 PROCEDURE — 76642 ULTRASOUND BREAST LIMITED: CPT | Mod: 26,50,, | Performed by: STUDENT IN AN ORGANIZED HEALTH CARE EDUCATION/TRAINING PROGRAM

## 2025-06-16 PROCEDURE — 76642 ULTRASOUND BREAST LIMITED: CPT | Mod: TC,50

## 2025-06-16 PROCEDURE — 77062 BREAST TOMOSYNTHESIS BI: CPT | Mod: 26,,, | Performed by: STUDENT IN AN ORGANIZED HEALTH CARE EDUCATION/TRAINING PROGRAM

## 2025-08-05 ENCOUNTER — LAB VISIT (OUTPATIENT)
Dept: LAB | Facility: HOSPITAL | Age: 35
End: 2025-08-05
Payer: MEDICAID

## 2025-08-05 DIAGNOSIS — R73.03 PREDIABETES: ICD-10-CM

## 2025-08-05 DIAGNOSIS — Z79.899 POLYPHARMACY: ICD-10-CM

## 2025-08-05 DIAGNOSIS — Z79.899 ENCOUNTER FOR LONG-TERM CURRENT USE OF MEDICATION: ICD-10-CM

## 2025-08-05 LAB
CHOLEST SERPL-MCNC: 236 MG/DL (ref 120–199)
CHOLEST/HDLC SERPL: 7.9 {RATIO} (ref 2–5)
EAG (OHS): 117 MG/DL (ref 68–131)
HBA1C MFR BLD: 5.7 % (ref 4–5.6)
HDLC SERPL-MCNC: 30 MG/DL (ref 40–75)
HDLC SERPL: 12.7 % (ref 20–50)
INSULIN SERPL-ACNC: 30.7 UU/ML
LDLC SERPL CALC-MCNC: 166.2 MG/DL (ref 63–159)
NONHDLC SERPL-MCNC: 206 MG/DL
TRIGL SERPL-MCNC: 199 MG/DL (ref 30–150)

## 2025-08-05 PROCEDURE — 83525 ASSAY OF INSULIN: CPT

## 2025-08-05 PROCEDURE — 83036 HEMOGLOBIN GLYCOSYLATED A1C: CPT

## 2025-08-05 PROCEDURE — 80061 LIPID PANEL: CPT

## 2025-08-05 PROCEDURE — 80183 DRUG SCRN QUANT OXCARBAZEPIN: CPT

## 2025-08-05 PROCEDURE — 36415 COLL VENOUS BLD VENIPUNCTURE: CPT | Mod: PO

## 2025-08-07 LAB — 10OH-CARBAZEPINE SERPL-MCNC: 4 MCG/ML (ref 10–35)
